# Patient Record
Sex: FEMALE | Race: WHITE | NOT HISPANIC OR LATINO | Employment: FULL TIME | URBAN - METROPOLITAN AREA
[De-identification: names, ages, dates, MRNs, and addresses within clinical notes are randomized per-mention and may not be internally consistent; named-entity substitution may affect disease eponyms.]

---

## 2017-04-05 ENCOUNTER — OFFICE VISIT (OUTPATIENT)
Dept: OPTOMETRY | Facility: CLINIC | Age: 42
End: 2017-04-05
Payer: COMMERCIAL

## 2017-04-05 DIAGNOSIS — H02.88A MEIBOMIAN GLAND DYSFUNCTION (MGD) OF UPPER AND LOWER LIDS OF BOTH EYES: ICD-10-CM

## 2017-04-05 DIAGNOSIS — H02.88B MEIBOMIAN GLAND DYSFUNCTION (MGD) OF UPPER AND LOWER LIDS OF BOTH EYES: ICD-10-CM

## 2017-04-05 DIAGNOSIS — H52.203 MYOPIA OF BOTH EYES WITH ASTIGMATISM: Primary | ICD-10-CM

## 2017-04-05 DIAGNOSIS — H52.13 MYOPIA OF BOTH EYES WITH ASTIGMATISM: Primary | ICD-10-CM

## 2017-04-05 PROCEDURE — 92015 DETERMINE REFRACTIVE STATE: CPT | Performed by: OPTOMETRIST

## 2017-04-05 PROCEDURE — 92004 COMPRE OPH EXAM NEW PT 1/>: CPT | Performed by: OPTOMETRIST

## 2017-04-05 ASSESSMENT — VISUAL ACUITY
OD_CC: 20/30
OD_SC+: -1
OD_CC: 20/20
OS_SC: 20/80
METHOD: SNELLEN - LINEAR
OS_CC: 20/20
METHOD_MR: PRA 1.25
CORRECTION_TYPE: GLASSES
OD_SC: 20/80
OS_SC+: -1
OS_CC: 20/30

## 2017-04-05 ASSESSMENT — REFRACTION_WEARINGRX
OS_AXIS: 083
OS_CYLINDER: +0.75
OD_AXIS: 056
OD_SPHERE: -2.50
OD_CYLINDER: +0.75
OS_SPHERE: -2.75

## 2017-04-05 ASSESSMENT — TONOMETRY
IOP_METHOD: APPLANATION
OD_IOP_MMHG: 10
OS_IOP_MMHG: 11

## 2017-04-05 ASSESSMENT — CONF VISUAL FIELD
OS_NORMAL: 1
OD_NORMAL: 1

## 2017-04-05 ASSESSMENT — REFRACTION_MANIFEST
OD_AXIS: 091
OS_AXIS: 087
OD_CYLINDER: +0.50
OS_CYLINDER: +0.75
OS_SPHERE: -3.00
OD_SPHERE: -2.50

## 2017-04-05 ASSESSMENT — CUP TO DISC RATIO
OD_RATIO: 0.4
OS_RATIO: 0.35

## 2017-04-05 ASSESSMENT — EXTERNAL EXAM - LEFT EYE: OS_EXAM: NORMAL

## 2017-04-05 ASSESSMENT — EXTERNAL EXAM - RIGHT EYE: OD_EXAM: NORMAL

## 2017-04-05 NOTE — PROGRESS NOTES
Chief Complaint   Patient presents with     COMPREHENSIVE EYE EXAM      Near vision complaints  Last Eye Exam: 3yrs  Dilated Previously: Yes    What are you currently using to see?  glasses     works as a pharmacist at the [employer]     Distance Vision Acuity: Satisfied with vision    Near Vision Acuity: Not satisfied     Eye Comfort: good  Do you use eye drops? : No  Occupation or HobbiesComputer/Sewing             Medical, surgical and family histories reviewed and updated 4/5/2017.       OBJECTIVE: See Ophthalmology exam    ASSESSMENT:    ICD-10-CM    1. Myopia of both eyes with astigmatism H52.13 EYE EXAM (SIMPLE-NONBILLABLE)    H52.203 REFRACTION   2. Meibomian gland dysfunction (MGD) of upper and lower lids of both eyes H02.89     H01.002     H01.004     H01.005     without symptomatic dry eye    PLAN:   Warm compresses / lid massage, fish oil  Alexsandra Zavala OD

## 2017-04-05 NOTE — MR AVS SNAPSHOT
After Visit Summary   4/5/2017    Jayleen Mccabe    MRN: [id number]           Patient Information     Date Of Birth          1975        Visit Information        Provider Department      4/5/2017 2:00 PM Alexsandra Zavala OD [facility]        Today's Diagnoses     Myopia of left eye with astigmatism    -  1    Meibomian gland dysfunction (MGD) of upper and lower lids of both eyes          Care Instructions    Very little prescription change , emerging presbyopia ( loss of focus at Near)  Meibomian gland dysfunction which causes dry eye   DRY EYE TREATMENT    I recommend using artificial tears for your dry eye. There are over the counter drops that work well and may be used up to 4x daily. ( systane balance, refresh optive, soothe xp) If you need more than 4 drops daily, use a preservative free product which come in individual vials which may be used for 24 hours and discarded.   Artificial tears work best as a preventative and not as well after your eyes are starting to bother you.  It may take 4- 6 weeks of using the drops before you notice improvement.  If after that time you are still having problems schedule an appointment for an evaluation and discussion of different treatments.  Dry eyes are a chronic condition and you may have more symptoms at certain times of the year.      In addition:  Warm compresses once to twice daily for 5-10 minutes    Omega 3 fatty acid supplements 1-2x daily           Follow-ups after your visit        Your next 10 appointments already scheduled     Apr 13, 2017  7:40 AM CDT   MyChart Physical Adult with Ana Ryan MD   [facility] ([facility])    [address]  [address]  [place] [id number] [phone]              Who to contact     If you have questions or need follow up information about today's clinic visit or your schedule please contact [facility] directly at  [phone].  Normal or non-critical lab and imaging results will be communicated to you by MyChart, letter or phone within 4 business days after the clinic has received the results. If you do not hear from us within 7 days, please contact the clinic through LumiFoldhart or phone. If you have a critical or abnormal lab result, we will notify you by phone as soon as possible.  Submit refill requests through "[employer] or call your pharmacy and they will forward the refill request to us. Please allow 3 business days for your refill to be completed.          Additional Information About Your Visit        LumiFoldhart Information     "[employer] gives you secure access to your electronic health record. If you see a primary care provider, you can also send messages to your care team and make appointments. If you have questions, please call your primary care clinic.  If you do not have a primary care provider, please call [phone] and they will assist you.        Care EveryWhere ID     This is your Care EveryWhere ID. This could be used by other organizations to access your [place] medical records  [id number]         Blood Pressure from Last 3 Encounters:   04/18/16 100/70   05/18/15 94/60   05/05/14 96/64    Weight from Last 3 Encounters:   04/18/16 47.2 kg (104 lb)   05/18/15 47.6 kg (105 lb)   05/05/14 47.4 kg (104 lb 8 oz)              Today, you had the following     No orders found for display       Primary Care Provider Office Phone # Fax #    Ana Ryan [phone]       [facility] [address] [place] [address]        Thank you!     Thank you for choosing [facility]  for your care. Our goal is always to provide you with excellent care. Hearing back from our patients is one way we can continue to improve our services. Please take a few minutes to complete the written survey that you may receive in the mail after your visit with us. Thank you!             Your  Updated Medication List - Protect others around you: Learn how to safely use, store and throw away your medicines at www.disposemymeds.org.          This list is accurate as of: 4/5/17  2:53 PM.  Always use your most recent med list.                   Brand Name Dispense Instructions for use    levothyroxine 150 MCG tablet    SYNTHROID/LEVOTHROID    90 tablet    Take 1 tablet (150 mcg) by mouth daily

## 2017-04-05 NOTE — PATIENT INSTRUCTIONS
Very little prescription change , emerging presbyopia ( loss of focus at Near)  Meibomian gland dysfunction which causes dry eye   DRY EYE TREATMENT    I recommend using artificial tears for your dry eye. There are over the counter drops that work well and may be used up to 4x daily. ( systane balance, refresh optive, soothe xp) If you need more than 4 drops daily, use a preservative free product which come in individual vials which may be used for 24 hours and discarded.   Artificial tears work best as a preventative and not as well after your eyes are starting to bother you.  It may take 4- 6 weeks of using the drops before you notice improvement.  If after that time you are still having problems schedule an appointment for an evaluation and discussion of different treatments.  Dry eyes are a chronic condition and you may have more symptoms at certain times of the year.      In addition:  Warm compresses once to twice daily for 5-10 minutes    Omega 3 fatty acid supplements 1-2x daily

## 2017-04-13 ENCOUNTER — OFFICE VISIT (OUTPATIENT)
Dept: PEDIATRICS | Facility: CLINIC | Age: 42
End: 2017-04-13
Payer: COMMERCIAL

## 2017-04-13 VITALS
SYSTOLIC BLOOD PRESSURE: 98 MMHG | HEART RATE: 74 BPM | WEIGHT: 100 LBS | OXYGEN SATURATION: 100 % | DIASTOLIC BLOOD PRESSURE: 62 MMHG | TEMPERATURE: 97.1 F | BODY MASS INDEX: 17.72 KG/M2 | HEIGHT: 63 IN

## 2017-04-13 DIAGNOSIS — Z12.31 VISIT FOR SCREENING MAMMOGRAM: ICD-10-CM

## 2017-04-13 DIAGNOSIS — E03.9 HYPOTHYROIDISM, UNSPECIFIED: ICD-10-CM

## 2017-04-13 DIAGNOSIS — Z00.00 ENCOUNTER FOR ROUTINE ADULT HEALTH EXAMINATION WITHOUT ABNORMAL FINDINGS: Primary | ICD-10-CM

## 2017-04-13 DIAGNOSIS — S59.901A INJURY OF RIGHT ELBOW, INITIAL ENCOUNTER: ICD-10-CM

## 2017-04-13 PROCEDURE — 82306 VITAMIN D 25 HYDROXY: CPT | Performed by: PEDIATRICS

## 2017-04-13 PROCEDURE — 84443 ASSAY THYROID STIM HORMONE: CPT | Performed by: PEDIATRICS

## 2017-04-13 PROCEDURE — 84439 ASSAY OF FREE THYROXINE: CPT | Performed by: PEDIATRICS

## 2017-04-13 PROCEDURE — 36415 COLL VENOUS BLD VENIPUNCTURE: CPT | Performed by: PEDIATRICS

## 2017-04-13 PROCEDURE — 99396 PREV VISIT EST AGE 40-64: CPT | Performed by: PEDIATRICS

## 2017-04-13 RX ORDER — LEVOTHYROXINE SODIUM 150 UG/1
150 TABLET ORAL DAILY
Qty: 90 TABLET | Refills: 3 | Status: SHIPPED | OUTPATIENT
Start: 2017-04-13 | End: 2018-05-21

## 2017-04-13 NOTE — MR AVS SNAPSHOT
After Visit Summary   4/13/2017    Jayleen Mccabe    MRN: [id number]           Patient Information     Date Of Birth          1975        Visit Information        Provider Department      4/13/2017 7:40 AM Ana Ryan MD [facility]        Today's Diagnoses     Encounter for routine adult health examination without abnormal findings    -  1    Hypothyroidism, unspecified        Visit for screening mammogram        Injury of right elbow, initial encounter          Care Instructions    Plan to schedule your mammogram this summer    Labs today - this is now on the first floor    I'll let you know if we need to change your levothyroxine dosing          Preventive Health Recommendations  Female Ages 40 to 49    Yearly exam:     See your health care provider every year in order to  1. Review health changes.   2. Discuss preventive care.    3. Review your medicines if your doctor prescribed any.      Get a Pap test every three years (unless you have an abnormal result and your provider advises testing more often).      If you get Pap tests with HPV test, you only need to test every 5 years, unless you have an abnormal result. You do not need a Pap test if your uterus was removed (hysterectomy) and you have not had cancer.      You should be tested each year for STDs (sexually transmitted diseases), if you're at risk.       Ask your doctor if you should have a mammogram.      Have a colonoscopy (test for colon cancer) if someone in your family has had colon cancer or polyps before age 50.       Have a cholesterol test every 5 years.       Have a diabetes test (fasting glucose) after age 45. If you are at risk for diabetes, you should have this test every 3 years.    Shots: Get a flu shot each year. Get a tetanus shot every 10 years.     Nutrition:     Eat at least 5 servings of fruits and vegetables each day.    Eat whole-grain bread, whole-wheat pasta and brown rice instead of white  grains and rice.    Talk to your provider about Calcium and Vitamin D.     Lifestyle    Exercise at least 150 minutes a week (an average of 30 minutes a day, 5 days a week). This will help you control your weight and prevent disease.    Limit alcohol to one drink per day.    No smoking.     Wear sunscreen to prevent skin cancer.    See your dentist every six months for an exam and cleaning.        Follow-ups after your visit        Future tests that were ordered for you today     Open Future Orders        Priority Expected Expires Ordered    MA Screen Bilateral w/Faustino Routine  4/13/2018 4/13/2017            Who to contact     If you have questions or need follow up information about today's clinic visit or your schedule please contact [facility] directly at [phone].  Normal or non-critical lab and imaging results will be communicated to you by Re[employer]hart, letter or phone within 4 business days after the clinic has received the results. If you do not hear from us within 7 days, please contact the clinic through [employer] or phone. If you have a critical or abnormal lab result, we will notify you by phone as soon as possible.  Submit refill requests through [employer] or call your pharmacy and they will forward the refill request to us. Please allow 3 business days for your refill to be completed.          Additional Information About Your Visit        [employer] Information     [employer] gives you secure access to your electronic health record. If you see a primary care provider, you can also send messages to your care team and make appointments. If you have questions, please call your primary care clinic.  If you do not have a primary care provider, please call [phone] and they will assist you.        Care EveryWhere ID     This is your Care EveryWhere ID. This could be used by other organizations to access your [place] medical records  [id number]        Your Vitals Were     Pulse Temperature Height  "Pulse Oximetry BMI (Body Mass Index)       74 97.1  F (36.2  C) (Tympanic) 5' 2.5\" (1.588 m) 100% 18 kg/m2        Blood Pressure from Last 3 Encounters:   04/13/17 98/62   04/18/16 100/70   05/18/15 94/60    Weight from Last 3 Encounters:   04/13/17 100 lb (45.4 kg)   04/18/16 104 lb (47.2 kg)   05/18/15 105 lb (47.6 kg)              We Performed the Following     TSH with free T4 reflex     Vitamin D deficiency screening          Where to get your medicines      These medications were sent to [id number]  [place], [place] - [address]  [address] [address]     Phone:  [phone]     levothyroxine 150 MCG tablet          Primary Care Provider Office Phone # Fax #    Ana Ryan [phone]       [address] [place] [address]        Thank you!     Thank you for choosing [facility]  for your care. Our goal is always to provide you with excellent care. Hearing back from our patients is one way we can continue to improve our services. Please take a few minutes to complete the written survey that you may receive in the mail after your visit with us. Thank you!             Your Updated Medication List - Protect others around you: Learn how to safely use, store and throw away your medicines at www.disposemymeds.org.          This list is accurate as of: 4/13/17  8:07 AM.  Always use your most recent med list.                   Brand Name Dispense Instructions for use    levothyroxine 150 MCG tablet    SYNTHROID/LEVOTHROID    90 tablet    Take 1 tablet (150 mcg) by mouth daily         "

## 2017-04-13 NOTE — NURSING NOTE
"Chief Complaint   Patient presents with     Physical       Initial BP 98/62 (BP Location: Right arm, Patient Position: Chair, Cuff Size: Adult Regular)  Pulse 74  Temp 97.1  F (36.2  C) (Tympanic)  Ht 5' 2.5\" (1.588 m)  Wt 100 lb (45.4 kg)  SpO2 100%  BMI 18 kg/m2 Estimated body mass index is 18 kg/(m^2) as calculated from the following:    Height as of this encounter: 5' 2.5\" (1.588 m).    Weight as of this encounter: 100 lb (45.4 kg).  Medication Reconciliation: complete  "

## 2017-04-13 NOTE — PROGRESS NOTES
SUBJECTIVE:     CC: Jayleen Mccabe is an 42 year old woman who presents for preventive health visit.     Physical   Annual:     Getting at least 3 servings of Calcium per day::  Yes    Bi-annual eye exam::  Yes    Dental care twice a year::  Yes    Sleep apnea or symptoms of sleep apnea::  None    Diet::  Regular (no restrictions)    Frequency of exercise::  2-3 days/week    Duration of exercise::  15-30 minutes    Taking medications regularly::  Yes    Medication side effects::  Not applicable    Additional concerns today::  No      - fall on ice, look at right elbow     Today's PHQ-2 Score:   PHQ-2 ( 1999 Pfizer) 4/10/2017   Little interest or pleasure in doing things Not at all   Feeling down, depressed or hopeless Not at all   PHQ-2 Score 0       Abuse: Current or Past(Physical, Sexual or Emotional)- No  Do you feel safe in your environment - Yes    Social History   Substance Use Topics     Smoking status: Never Smoker     Smokeless tobacco: Never Used     Alcohol use No     The patient does not drink >3 drinks per day nor >7 drinks per week.    Recent Labs   Lab Test  05/05/14   1337  05/31/13   0754   CHOL   --   146   HDL   --   66   LDL  70  71   TRIG   --   44   CHOLHDLRATIO   --   2.2       Reviewed orders with patient.  Reviewed health maintenance and updated orders accordingly - Yes    Mammo Decision Support:  Patient under age 50, mutual decision reflected in health maintenance.      Pertinent mammograms are reviewed under the imaging tab.  History of abnormal Pap smear: NO - age 30- 65 PAP every 3 years recommended    Reviewed and updated as needed this visit by clinical staff  Tobacco  Allergies  Med Hx  Surg Hx  Fam Hx  Soc Hx        Reviewed and updated as needed this visit by Provider            Fall on the fight elbow - still hurts a little.  Full ROM.  No swelling.  Fall was about 2 months ago - still painful if leans on elbow with too much weight.    Hypothyroidism - taking medication as  "directed.  No symptoms or low or high thyroid.      Doing well with work, family.    No changes to family history.    Regular periods, not heavy.  Condoms for birth control.       ROS: 10 point ROS neg other than the symptoms noted above in the HPI.      Problem list, Medication list, Allergies, and Medical/Social/Surgical histories reviewed in [facility] and updated as appropriate.  OBJECTIVE:     BP 98/62 (BP Location: Right arm, Patient Position: Chair, Cuff Size: Adult Regular)  Pulse 74  Temp 97.1  F (36.2  C) (Tympanic)  Ht 5' 2.5\" (1.588 m)  Wt 100 lb (45.4 kg)  SpO2 100%  BMI 18 kg/m2  EXAM:  GENERAL: healthy, alert and no distress  EYES: Eyes grossly normal to inspection, PERRL and conjunctivae and sclerae normal  HENT: ear canals and TM's normal, nose and mouth without ulcers or lesions  NECK: no adenopathy, no asymmetry, masses, or scars and thyroid normal to palpation  RESP: lungs clear to auscultation - no rales, rhonchi or wheezes  BREAST: normal without masses, tenderness or nipple discharge and no palpable axillary masses or adenopathy  CV: regular rate and rhythm, normal S1 S2, no S3 or S4, no murmur, click or rub, no peripheral edema and peripheral pulses strong  ABDOMEN: soft, nontender, no hepatosplenomegaly, no masses and bowel sounds normal  MS: full right elbow ROM, no tenderness or swelling, otherwise no gross musculoskeletal defects noted, no edema  SKIN: no suspicious lesions or rashes  NEURO: Normal strength and tone, mentation intact and speech normal  PSYCH: mentation appears normal, affect normal/bright    ASSESSMENT/PLAN:         ICD-10-CM    1. Encounter for routine adult health examination without abnormal findings Z00.00 TSH with free T4 reflex     Vitamin D deficiency screening - currently taking 1000 IU daily.  Small boned - concern for higher risk of osteopenia   2. Hypothyroidism, unspecified E03.9 levothyroxine (SYNTHROID/LEVOTHROID) 150 MCG tablet     TSH with free T4 reflex " "  3. Visit for screening mammogram Z12.31 MA Screen Bilateral w/Faustino   4. Injury of right elbow, initial encounter S58.516N Anticipate continued improvement with resolution of pain over time - patient to alert me if pain worsens       COUNSELING:  Special attention given to:        Regular exercise       Healthy diet/nutrition       Vision screening       Contraception       Osteoporosis Prevention/Bone Health         reports that she has never smoked. She has never used smokeless tobacco.    Estimated body mass index is 18 kg/(m^2) as calculated from the following:    Height as of this encounter: 5' 2.5\" (1.588 m).    Weight as of this encounter: 100 lb (45.4 kg).       Counseling Resources:  ATP IV Guidelines  Pooled Cohorts Equation Calculator  Breast Cancer Risk Calculator  FRAX Risk Assessment  ICSI Preventive Guidelines  Dietary Guidelines for Americans, 2010  USDA's MyPlate  ASA Prophylaxis  Lung CA Screening    Ana Ryan MD  [facility] [place]  "

## 2017-04-13 NOTE — PATIENT INSTRUCTIONS
Plan to schedule your mammogram this summer    Labs today - this is now on the first floor    I'll let you know if we need to change your levothyroxine dosing          Preventive Health Recommendations  Female Ages 40 to 49    Yearly exam:     See your health care provider every year in order to  1. Review health changes.   2. Discuss preventive care.    3. Review your medicines if your doctor prescribed any.      Get a Pap test every three years (unless you have an abnormal result and your provider advises testing more often).      If you get Pap tests with HPV test, you only need to test every 5 years, unless you have an abnormal result. You do not need a Pap test if your uterus was removed (hysterectomy) and you have not had cancer.      You should be tested each year for STDs (sexually transmitted diseases), if you're at risk.       Ask your doctor if you should have a mammogram.      Have a colonoscopy (test for colon cancer) if someone in your family has had colon cancer or polyps before age 50.       Have a cholesterol test every 5 years.       Have a diabetes test (fasting glucose) after age 45. If you are at risk for diabetes, you should have this test every 3 years.    Shots: Get a flu shot each year. Get a tetanus shot every 10 years.     Nutrition:     Eat at least 5 servings of fruits and vegetables each day.    Eat whole-grain bread, whole-wheat pasta and brown rice instead of white grains and rice.    Talk to your provider about Calcium and Vitamin D.     Lifestyle    Exercise at least 150 minutes a week (an average of 30 minutes a day, 5 days a week). This will help you control your weight and prevent disease.    Limit alcohol to one drink per day.    No smoking.     Wear sunscreen to prevent skin cancer.    See your dentist every six months for an exam and cleaning.

## 2017-04-14 LAB
DEPRECATED CALCIDIOL+CALCIFEROL SERPL-MC: 27 UG/L (ref 20–75)
T4 FREE SERPL-MCNC: 1.09 NG/DL (ref 0.76–1.46)
TSH SERPL DL<=0.005 MIU/L-ACNC: 9.63 MU/L (ref 0.4–4)

## 2017-04-15 ENCOUNTER — MYC MEDICAL ADVICE (OUTPATIENT)
Dept: PEDIATRICS | Facility: CLINIC | Age: 42
End: 2017-04-15

## 2017-04-28 ENCOUNTER — RADIANT APPOINTMENT (OUTPATIENT)
Dept: MAMMOGRAPHY | Facility: CLINIC | Age: 42
End: 2017-04-28
Attending: PEDIATRICS
Payer: COMMERCIAL

## 2017-04-28 DIAGNOSIS — Z12.31 VISIT FOR SCREENING MAMMOGRAM: ICD-10-CM

## 2017-04-28 PROCEDURE — 77063 BREAST TOMOSYNTHESIS BI: CPT | Mod: TC

## 2017-04-28 PROCEDURE — G0202 SCR MAMMO BI INCL CAD: HCPCS | Mod: TC

## 2017-09-11 ENCOUNTER — MYC MEDICAL ADVICE (OUTPATIENT)
Dept: PEDIATRICS | Facility: CLINIC | Age: 42
End: 2017-09-11

## 2017-09-12 DIAGNOSIS — E03.9 HYPOTHYROIDISM, UNSPECIFIED: ICD-10-CM

## 2017-09-12 PROCEDURE — 36415 COLL VENOUS BLD VENIPUNCTURE: CPT | Performed by: PEDIATRICS

## 2017-09-12 PROCEDURE — 84443 ASSAY THYROID STIM HORMONE: CPT | Performed by: PEDIATRICS

## 2017-09-13 LAB — TSH SERPL DL<=0.005 MIU/L-ACNC: 2.78 MU/L (ref 0.4–4)

## 2018-05-20 ASSESSMENT — ENCOUNTER SYMPTOMS
CHILLS: 0
DIZZINESS: 0
HEADACHES: 0
ABDOMINAL PAIN: 0
HEMATOCHEZIA: 0
FREQUENCY: 0
CONSTIPATION: 0
WEAKNESS: 0
DIARRHEA: 0
HEMATURIA: 0
NERVOUS/ANXIOUS: 0
NAUSEA: 0
SHORTNESS OF BREATH: 0
HEARTBURN: 0
PALPITATIONS: 0
EYE PAIN: 0
JOINT SWELLING: 0
COUGH: 0
SORE THROAT: 0
FEVER: 0
ARTHRALGIAS: 0
PARESTHESIAS: 0
MYALGIAS: 0
DYSURIA: 0

## 2018-05-21 ENCOUNTER — OFFICE VISIT (OUTPATIENT)
Dept: PEDIATRICS | Facility: CLINIC | Age: 43
End: 2018-05-21
Payer: COMMERCIAL

## 2018-05-21 VITALS
BODY MASS INDEX: 18.61 KG/M2 | HEIGHT: 63 IN | DIASTOLIC BLOOD PRESSURE: 58 MMHG | TEMPERATURE: 99.7 F | SYSTOLIC BLOOD PRESSURE: 90 MMHG | WEIGHT: 105 LBS | OXYGEN SATURATION: 97 % | HEART RATE: 93 BPM

## 2018-05-21 DIAGNOSIS — Z00.00 ROUTINE GENERAL MEDICAL EXAMINATION AT A HEALTH CARE FACILITY: Primary | ICD-10-CM

## 2018-05-21 DIAGNOSIS — E55.9 VITAMIN D DEFICIENCY: ICD-10-CM

## 2018-05-21 DIAGNOSIS — Z12.4 SCREENING FOR MALIGNANT NEOPLASM OF CERVIX: ICD-10-CM

## 2018-05-21 DIAGNOSIS — E03.9 HYPOTHYROIDISM, UNSPECIFIED TYPE: ICD-10-CM

## 2018-05-21 PROCEDURE — 99396 PREV VISIT EST AGE 40-64: CPT | Performed by: PEDIATRICS

## 2018-05-21 PROCEDURE — 87624 HPV HI-RISK TYP POOLED RSLT: CPT | Performed by: PEDIATRICS

## 2018-05-21 PROCEDURE — G0145 SCR C/V CYTO,THINLAYER,RESCR: HCPCS | Performed by: PEDIATRICS

## 2018-05-21 RX ORDER — LEVOTHYROXINE SODIUM 150 UG/1
150 TABLET ORAL DAILY
Qty: 90 TABLET | Refills: 3 | Status: SHIPPED | OUTPATIENT
Start: 2018-05-21 | End: 2019-06-04

## 2018-05-21 ASSESSMENT — ENCOUNTER SYMPTOMS
HEMATURIA: 0
SORE THROAT: 0
FREQUENCY: 0
HEMATOCHEZIA: 0
PARESTHESIAS: 0
ABDOMINAL PAIN: 0
EYE PAIN: 0
JOINT SWELLING: 0
SHORTNESS OF BREATH: 0
NAUSEA: 0
CONSTIPATION: 0
DIARRHEA: 0
MYALGIAS: 0
HEARTBURN: 0
HEADACHES: 0
CHILLS: 0
ARTHRALGIAS: 0
PALPITATIONS: 0
WEAKNESS: 0
DIZZINESS: 0
NERVOUS/ANXIOUS: 0
COUGH: 0
DYSURIA: 0
FEVER: 0

## 2018-05-21 NOTE — MR AVS SNAPSHOT
After Visit Summary   5/21/2018    Jayleen Mccabe    MRN: [id number]           Patient Information     Date Of Birth          1975        Visit Information        Provider Department      5/21/2018 3:00 PM Ana Ryan MD [facility] [place]        Today's Diagnoses     Routine general medical examination at a health care facility    -  1    Hypothyroidism, unspecified type        Screening for malignant neoplasm of cervix        Vitamin D deficiency          Care Instructions    Change to name brand synthroid and then come back for labs in about 6 weeks (fasting lab visit)        Preventive Health Recommendations  Female Ages 40 to 49    Yearly exam:     See your health care provider every year in order to  1. Review health changes.   2. Discuss preventive care.    3. Review your medicines if your doctor prescribed any.      Get a Pap test every three years (unless you have an abnormal result and your provider advises testing more often).      If you get Pap tests with HPV test, you only need to test every 5 years, unless you have an abnormal result. You do not need a Pap test if your uterus was removed (hysterectomy) and you have not had cancer.      You should be tested each year for STDs (sexually transmitted diseases), if you're at risk.       Ask your doctor if you should have a mammogram.      Have a colonoscopy (test for colon cancer) if someone in your family has had colon cancer or polyps before age 50.       Have a cholesterol test every 5 years.       Have a diabetes test (fasting glucose) after age 45. If you are at risk for diabetes, you should have this test every 3 years.    Shots: Get a flu shot each year. Get a tetanus shot every 10 years.     Nutrition:     Eat at least 5 servings of fruits and vegetables each day.    Eat whole-grain bread, whole-wheat pasta and brown rice instead of white grains and rice.    Talk to your provider about Calcium and Vitamin D.      Lifestyle    Exercise at least 150 minutes a week (an average of 30 minutes a day, 5 days a week). This will help you control your weight and prevent disease.    Limit alcohol to one drink per day.    No smoking.     Wear sunscreen to prevent skin cancer.    See your dentist every six months for an exam and cleaning.          Follow-ups after your visit        Future tests that were ordered for you today     Open Future Orders        Priority Expected Expires Ordered    TSH Routine  11/21/2018 5/21/2018    T4, free Routine  11/21/2018 5/21/2018    **Comprehensive metabolic panel FUTURE anytime Routine 5/21/2018 11/21/2018 5/21/2018    Lipid panel reflex to direct LDL Fasting Routine 5/21/2018 11/21/2018 5/21/2018    Vitamin D Deficiency Routine  11/21/2018 5/21/2018            Who to contact     If you have questions or need follow up information about today's clinic visit or your schedule please contact [facility] directly at [phone].  Normal or non-critical lab and imaging results will be communicated to you by [employer], letter or phone within 4 business days after the clinic has received the results. If you do not hear from us within 7 days, please contact the clinic through [employer] or phone. If you have a critical or abnormal lab result, we will notify you by phone as soon as possible.  Submit refill requests through [employer] or call your pharmacy and they will forward the refill request to us. Please allow 3 business days for your refill to be completed.          Additional Information About Your Visit        [employer] Information     [employer] gives you secure access to your electronic health record. If you see a primary care provider, you can also send messages to your care team and make appointments. If you have questions, please call your primary care clinic.  If you do not have a primary care provider, please call [phone] and they will assist you.        Care EveryWhere ID     This is  "your Care EveryWhere ID. This could be used by other organizations to access your [place] medical records  [id number]        Your Vitals Were     Pulse Temperature Height Last Period Pulse Oximetry BMI (Body Mass Index)    93 99.7  F (37.6  C) (Tympanic) 5' 2.5\" (1.588 m) 04/28/2018 (Approximate) 97% 18.9 kg/m2       Blood Pressure from Last 3 Encounters:   05/21/18 90/58   04/13/17 98/62   04/18/16 100/70    Weight from Last 3 Encounters:   05/21/18 105 lb (47.6 kg)   04/13/17 100 lb (45.4 kg)   04/18/16 104 lb (47.2 kg)              We Performed the Following     HPV High Risk Types DNA Cervical     Pap imaged thin layer screen with HPV - recommended age 30 - 65 years (select HPV order below)          Where to get your medicines      These medications were sent to [facility] [place] - [place] - [address]   [address], [place] [address]     Phone:  [phone]     levothyroxine 150 MCG tablet          Primary Care Provider Office Phone # Fax #    Ana Ryan [phone]       [address] [place] [address]        Equal Access to Services     [facility]: Hadii al ku hadasho Soomaali, waaxda luqadaha, qaybta kaalmada adeegyada, dawn benitez. So [facility] [phone].    ATENCIÓN: Si habla español, tiene a gr disposición servicios gratuitos de asistencia lingüística. Llame al [phone].    We comply with applicable federal civil rights laws and [place] laws. We do not discriminate on the basis of race, color, national origin, age, disability, sex, sexual orientation, or gender identity.            Thank you!     Thank you for choosing [facility]  for your care. Our goal is always to provide you with excellent care. Hearing back from our patients is one way we can continue to improve our services. Please take a few minutes to complete the written survey that you may receive in the " mail after your visit with us. Thank you!             Your Updated Medication List - Protect others around you: Learn how to safely use, store and throw away your medicines at www.disposemymeds.org.          This list is accurate as of 5/21/18  4:04 PM.  Always use your most recent med list.                   Brand Name Dispense Instructions for use Diagnosis    levothyroxine 150 MCG tablet    SYNTHROID/LEVOTHROID    90 tablet    Take 1 tablet (150 mcg) by mouth daily    Hypothyroidism, unspecified type

## 2018-05-21 NOTE — LETTER
May 29, 2018      Jayleen Mccabe  [address]  [place] [address]    Dear ,      This letter is in regards to the pap smear and HPV (Human Papillomavirus) test you had done recently. Your PAP test result is normal, but your HPV (Human Papillomavirus) test was positive for a high risk type of the HPV. HPV is a common virus found in sexually active men and women. Some high risk strains of the HPV virus can be risk factors for later development of cervical cancer.    About 80 percent of women have been exposed to HPV virus throughout their lifetime. There is no medication for the treatment of HPV. Typically your own immune system gets rid of the virus before it does harm. HPV is spread by direct skin-to-skin contact, including sexual intercourse, oral sex, anal sex, or any other contact involving the genital area (example: hand to genital contact). It is not possible to become infected with HPV by touching an object, such as a toilet seat. Most people who are infected with HPV have no signs or symptoms.    Your doctor has recommended that you have specific test called Colposcopy. This test allows the provider to closely examine your cervix. If biopsies are taken, the results will be complete within two weeks and will be used to determine the plan for future follow-up.      Please call [facility] [place] [facility] at [phone] to schedule this procedure with one of the ObGyn providers. Schedule this for a time when you are not due to have a period (if having regular menstrual bleeding). You can take an over the counter pain reliever 1 hour before your colposcopy. Nothing in the vagina for 24 hours before your colposcopy (no sex, douches, vaginal medications or lubricants).     If you have questions regarding this result please contact [phone].    Sincerely,      Ana Ryan MD/Lynette Nissen RN

## 2018-05-21 NOTE — PROGRESS NOTES
SUBJECTIVE:   CC: Jayleen Mccabe is an 43 year old woman who presents for preventive health visit.     Physical   Annual:     Getting at least 3 servings of Calcium per day::  Yes    Bi-annual eye exam::  Yes    Dental care twice a year::  Yes    Sleep apnea or symptoms of sleep apnea::  None    Diet::  Regular (no restrictions) and Gluten-free/reduced    Frequency of exercise::  2-3 days/week    Duration of exercise::  30-45 minutes    Taking medications regularly::  Yes    Medication side effects::  None    Additional concerns today::  YES (refills )                Today's PHQ-2 Score:   PHQ-2 ( 1999 Pfizer) 5/20/2018   Q1: Little interest or pleasure in doing things 0   Q2: Feeling down, depressed or hopeless 0   PHQ-2 Score 0   Q1: Little interest or pleasure in doing things Not at all   Q2: Feeling down, depressed or hopeless Not at all   PHQ-2 Score 0       Abuse: Current or Past(Physical, Sexual or Emotional)- No  Do you feel safe in your environment - Yes    Social History   Substance Use Topics     Smoking status: Never Smoker     Smokeless tobacco: Never Used     Alcohol use No     Alcohol Use 5/20/2018   If you drink alcohol do you typically have greater than 3 drinks per day OR greater than 7 drinks per week? No   No flowsheet data found.    Reviewed orders with patient.  Reviewed health maintenance and updated orders accordingly - Yes    Patient under age 50, mutual decision reflected in health maintenance.      Pertinent mammograms are reviewed under the imaging tab.  History of abnormal Pap smear: NO - age 30- 65 PAP every 3 years recommended    Reviewed and updated as needed this visit by clinical staff  Tobacco  Allergies  Meds  Med Hx  Surg Hx  Fam Hx  Soc Hx        Reviewed and updated as needed this visit by Provider          Hypothyroidism - most recent labs are normal, but still feels a little fuzzy in her thinking and would like to try synthroid name brand.  No other thyroid symptoms  "noted.    Vitamin D low last year - has started supplementation      Review of Systems   Constitutional: Negative for chills and fever.   HENT: Negative for congestion, ear pain, hearing loss and sore throat.    Eyes: Negative for pain and visual disturbance.   Respiratory: Negative for cough and shortness of breath.    Cardiovascular: Negative for chest pain, palpitations and peripheral edema.   Gastrointestinal: Negative for abdominal pain, constipation, diarrhea, heartburn, hematochezia and nausea.   Genitourinary: Negative for dysuria, frequency, genital sores, hematuria, pelvic pain, urgency, vaginal bleeding and vaginal discharge.   Musculoskeletal: Negative for arthralgias, joint swelling and myalgias.   Skin: Negative for rash.   Neurological: Negative for dizziness, weakness, headaches and paresthesias.   Psychiatric/Behavioral: Negative for mood changes. The patient is not nervous/anxious.           OBJECTIVE:   BP 90/58 (BP Location: Right arm, Patient Position: Right side, Cuff Size: Adult Regular)  Pulse 93  Temp 99.7  F (37.6  C) (Tympanic)  Ht 5' 2.5\" (1.588 m)  Wt 105 lb (47.6 kg)  LMP 04/28/2018 (Approximate)  SpO2 97%  BMI 18.9 kg/m2  Physical Exam  GENERAL: healthy, alert and no distress  EYES: Eyes grossly normal to inspection, PERRL and conjunctivae and sclerae normal  HENT: ear canals and TM's normal, nose and mouth without ulcers or lesions  NECK: no adenopathy, no asymmetry, masses, or scars and thyroid normal to palpation  RESP: lungs clear to auscultation - no rales, rhonchi or wheezes  BREAST: normal without masses, tenderness or nipple discharge and no palpable axillary masses or adenopathy  CV: regular rate and rhythm, normal S1 S2, no S3 or S4, no murmur, click or rub, no peripheral edema and peripheral pulses strong  ABDOMEN: soft, nontender, no hepatosplenomegaly, no masses and bowel sounds normal   (female): normal female external genitalia, normal urethral meatus, vaginal " "mucosa pink, moist, well rugated, and normal cervix/adnexa/uterus without masses or discharge  MS: no gross musculoskeletal defects noted, no edema  SKIN: small, healing sore on tailbone  NEURO: Normal strength and tone, mentation intact and speech normal  PSYCH: mentation appears normal, affect normal/bright    ASSESSMENT/PLAN:       ICD-10-CM    1. Routine general medical examination at a health care facility Z00.00 **Comprehensive metabolic panel FUTURE anytime     Lipid panel reflex to direct LDL Fasting     Vitamin D Deficiency   2. Hypothyroidism, unspecified type E03.9 levothyroxine (SYNTHROID/LEVOTHROID) 150 MCG tablet     TSH     T4, free    Trial of switch to synthroid to help with control of symptoms, labs 6 weeks thereafter   3. Vitamin D deficiency E55.9 Vitamin D Deficiency   4. Screening for malignant neoplasm of cervix Z12.4 Pap imaged thin layer screen with HPV - recommended age 30 - 65 years (select HPV order below)     HPV High Risk Types DNA Cervical       COUNSELING:  Special attention given to:        Regular exercise       Healthy diet/nutrition       Vision screening       Osteoporosis Prevention/Bone Health         reports that she has never smoked. She has never used smokeless tobacco.    Estimated body mass index is 18.9 kg/(m^2) as calculated from the following:    Height as of this encounter: 5' 2.5\" (1.588 m).    Weight as of this encounter: 105 lb (47.6 kg).       Counseling Resources:  ATP IV Guidelines  Pooled Cohorts Equation Calculator  Breast Cancer Risk Calculator  FRAX Risk Assessment  ICSI Preventive Guidelines  Dietary Guidelines for Americans, 2010  USDA's MyPlate  ASA Prophylaxis  Lung CA Screening    Ana Ryan MD  [facility] [place]  "

## 2018-05-21 NOTE — PATIENT INSTRUCTIONS
Change to name brand synthroid and then come back for labs in about 6 weeks (fasting lab visit)        Preventive Health Recommendations  Female Ages 40 to 49    Yearly exam:     See your health care provider every year in order to  1. Review health changes.   2. Discuss preventive care.    3. Review your medicines if your doctor prescribed any.      Get a Pap test every three years (unless you have an abnormal result and your provider advises testing more often).      If you get Pap tests with HPV test, you only need to test every 5 years, unless you have an abnormal result. You do not need a Pap test if your uterus was removed (hysterectomy) and you have not had cancer.      You should be tested each year for STDs (sexually transmitted diseases), if you're at risk.       Ask your doctor if you should have a mammogram.      Have a colonoscopy (test for colon cancer) if someone in your family has had colon cancer or polyps before age 50.       Have a cholesterol test every 5 years.       Have a diabetes test (fasting glucose) after age 45. If you are at risk for diabetes, you should have this test every 3 years.    Shots: Get a flu shot each year. Get a tetanus shot every 10 years.     Nutrition:     Eat at least 5 servings of fruits and vegetables each day.    Eat whole-grain bread, whole-wheat pasta and brown rice instead of white grains and rice.    Talk to your provider about Calcium and Vitamin D.     Lifestyle    Exercise at least 150 minutes a week (an average of 30 minutes a day, 5 days a week). This will help you control your weight and prevent disease.    Limit alcohol to one drink per day.    No smoking.     Wear sunscreen to prevent skin cancer.    See your dentist every six months for an exam and cleaning.

## 2018-05-24 LAB
COPATH REPORT: NORMAL
PAP: NORMAL

## 2018-05-29 ENCOUNTER — RESULT FOLLOW UP (OUTPATIENT)
Dept: PEDIATRICS | Facility: CLINIC | Age: 43
End: 2018-05-29

## 2018-05-29 DIAGNOSIS — R87.810 CERVICAL HIGH RISK HPV (HUMAN PAPILLOMAVIRUS) TEST POSITIVE: ICD-10-CM

## 2018-05-29 LAB
FINAL DIAGNOSIS: ABNORMAL
HPV HR 12 DNA CVX QL NAA+PROBE: NEGATIVE
HPV16 DNA SPEC QL NAA+PROBE: POSITIVE
HPV18 DNA SPEC QL NAA+PROBE: NEGATIVE
SPECIMEN DESCRIPTION: ABNORMAL
SPECIMEN SOURCE CVX/VAG CYTO: ABNORMAL

## 2018-05-29 NOTE — PROGRESS NOTES
Hx of PREET 2 2006  LEEP 2006  10/08 LGSIL  11/08 colp WNL  4/09 pap WNL  2/10 pap WNL  4/11 pap WNL  04/13 pap WNL  05/05/14 Pap= Normal, Neg HPV. Repeat co-test 1 yr, if NIL/Neg could go to 3 yr co-test per guidelines.  05/18/15 Pap= Normal, Neg HPV. 3 yr co-testing per ASCCP guidelines  5/21/18 NIL, +HPV 16. Plan colp by 8/21/18 5/29/18 Left message to call back. Patient notified of results and recommendations. Information also sent through [employer].    7/23/18 [place] - PREET 1. Plan 1 yr co-test. Due by 7/23/19. Patient informed through [employer]. (LN)  6/4/19 Epithelial Cell Abnormality: Glandular Cell: Atypical Endocervical Cells, Neg HPV. Plan colp and EMB by 9/4/19.  6/12/19 Left message to call back.   6/13/19 Pt notified. ([place])  6/16/19 [place] bx PREET 1, ECC: neg. EMB: neg. Plan: cotest in 1 yr.

## 2018-07-12 DIAGNOSIS — Z00.00 ROUTINE GENERAL MEDICAL EXAMINATION AT A HEALTH CARE FACILITY: ICD-10-CM

## 2018-07-12 DIAGNOSIS — E03.9 HYPOTHYROIDISM, UNSPECIFIED TYPE: ICD-10-CM

## 2018-07-12 DIAGNOSIS — E55.9 VITAMIN D DEFICIENCY: ICD-10-CM

## 2018-07-12 LAB
ALBUMIN SERPL-MCNC: 3.8 G/DL (ref 3.4–5)
ALP SERPL-CCNC: 40 U/L (ref 40–150)
ALT SERPL W P-5'-P-CCNC: 25 U/L (ref 0–50)
ANION GAP SERPL CALCULATED.3IONS-SCNC: 9 MMOL/L (ref 3–14)
AST SERPL W P-5'-P-CCNC: 12 U/L (ref 0–45)
BILIRUB SERPL-MCNC: 0.5 MG/DL (ref 0.2–1.3)
BUN SERPL-MCNC: 12 MG/DL (ref 7–30)
CALCIUM SERPL-MCNC: 8.6 MG/DL (ref 8.5–10.1)
CHLORIDE SERPL-SCNC: 105 MMOL/L (ref 94–109)
CHOLEST SERPL-MCNC: 183 MG/DL
CO2 SERPL-SCNC: 24 MMOL/L (ref 20–32)
CREAT SERPL-MCNC: 0.7 MG/DL (ref 0.52–1.04)
GFR SERPL CREATININE-BSD FRML MDRD: >90 ML/MIN/1.7M2
GLUCOSE SERPL-MCNC: 90 MG/DL (ref 70–99)
HDLC SERPL-MCNC: 81 MG/DL
LDLC SERPL CALC-MCNC: 94 MG/DL
NONHDLC SERPL-MCNC: 102 MG/DL
POTASSIUM SERPL-SCNC: 3.8 MMOL/L (ref 3.4–5.3)
PROT SERPL-MCNC: 7.2 G/DL (ref 6.8–8.8)
SODIUM SERPL-SCNC: 138 MMOL/L (ref 133–144)
T4 FREE SERPL-MCNC: 1.36 NG/DL (ref 0.76–1.46)
TRIGL SERPL-MCNC: 38 MG/DL
TSH SERPL DL<=0.005 MIU/L-ACNC: 1.48 MU/L (ref 0.4–4)

## 2018-07-12 PROCEDURE — 80053 COMPREHEN METABOLIC PANEL: CPT | Performed by: PEDIATRICS

## 2018-07-12 PROCEDURE — 82306 VITAMIN D 25 HYDROXY: CPT | Performed by: PEDIATRICS

## 2018-07-12 PROCEDURE — 80061 LIPID PANEL: CPT | Performed by: PEDIATRICS

## 2018-07-12 PROCEDURE — 36415 COLL VENOUS BLD VENIPUNCTURE: CPT | Performed by: PEDIATRICS

## 2018-07-12 PROCEDURE — 84439 ASSAY OF FREE THYROXINE: CPT | Performed by: PEDIATRICS

## 2018-07-12 PROCEDURE — 84443 ASSAY THYROID STIM HORMONE: CPT | Performed by: PEDIATRICS

## 2018-07-13 LAB — DEPRECATED CALCIDIOL+CALCIFEROL SERPL-MC: 53 UG/L (ref 20–75)

## 2018-07-23 ENCOUNTER — OFFICE VISIT (OUTPATIENT)
Dept: OBGYN | Facility: CLINIC | Age: 43
End: 2018-07-23
Payer: COMMERCIAL

## 2018-07-23 VITALS — DIASTOLIC BLOOD PRESSURE: 68 MMHG | SYSTOLIC BLOOD PRESSURE: 104 MMHG | WEIGHT: 103.6 LBS | BODY MASS INDEX: 18.65 KG/M2

## 2018-07-23 DIAGNOSIS — B97.7 HIGH RISK HPV INFECTION: Primary | ICD-10-CM

## 2018-07-23 PROCEDURE — 57454 BX/CURETT OF CERVIX W/SCOPE: CPT | Performed by: OBSTETRICS & GYNECOLOGY

## 2018-07-23 PROCEDURE — 88305 TISSUE EXAM BY PATHOLOGIST: CPT | Performed by: OBSTETRICS & GYNECOLOGY

## 2018-07-23 NOTE — PROGRESS NOTES
INDICATIONS:                                                    Jayleen Mccabe is a 43 year old female who had a recent NIL pap.  HPV positive. She does have a history of prior abnormal pap and underwent LEEP in 2006 following an ASC-H pap. Here today for colposcopy. Discussed indication, risks of infection and bleeding.    Was a consent obtained?  Yes    Lab Results   Component Value Date    PAP NIL, +HPV 16 05/21/2018    PAP NIL, HPV negative 05/18/2015    PAP NIL 05/05/2014     Before the procedure, it was ensured that the patient was educated regarding the nature of her findings to date, the implications of them, and what was to be done. She has been made aware of the role of HPV, the natural history of infection, ways to minimize her future risk, the effect of HPV on the cervix, and treatment options available should they be indicated. The details of the colposcopic procedure were reviewed, as well as the risks of pain, infection and bleeding. All questions were answered before proceeding, and informed consent was therefore obtained.    PROCEDURE:                                                    /68 (BP Location: Right arm, Patient Position: Chair, Cuff Size: Adult Regular)  Wt 103 lb 9.6 oz (47 kg)  LMP 07/13/2018  BMI 18.65 kg/m2   Cervix is stained with acetic acid and viewed colposcopically. Squamocolumnar junction is visualized in it's entirity. no visible lesions . Lugol's applied with absent uptake at 12 o'clock. Biopsy done at 12 o'clock. Endocervical curretage Done     POST PROCEDURE:                                                      IMPRESSION: colposcopy adequate and HPV    PLAN : Await the results of the biopsies.  Repeat pap in 12 months.  She  tolerated the procedure well. There were no complications. Patient was discharged in stable condition. This patient would e an appropriate candidate for office based excisional procedure if indicated by biopsy results.    Patient advised to call  the clinic if excessive bleeding, pelvic pain, or fever.     Follow-up plan based on pathology results.    Oxana Rios MD

## 2018-07-23 NOTE — NURSING NOTE
"  Chief Complaint   Patient presents with     Colposcopy     pap on 05/21/18- was NIL with positive HPV 16       Initial /68 (BP Location: Right arm, Patient Position: Chair, Cuff Size: Adult Regular)  Wt 103 lb 9.6 oz (47 kg)  LMP 07/13/2018  BMI 18.65 kg/m2 Estimated body mass index is 18.65 kg/(m^2) as calculated from the following:    Height as of 5/21/18: 5' 2.5\" (1.588 m).    Weight as of this encounter: 103 lb 9.6 oz (47 kg).  Medication Reconciliation: complete      Carrol Brewster CMA      "

## 2018-07-23 NOTE — MR AVS SNAPSHOT
After Visit Summary   7/23/2018    Jayleen Mccabe    MRN: [id number]           Patient Information     Date Of Birth          1975        Visit Information        Provider Department      7/23/2018 8:45 AM [facility]        Today's Diagnoses     High risk HPV infection    -  1       Follow-ups after your visit        Who to contact     If you have questions or need follow up information about today's clinic visit or your schedule please contact [facility] directly at [phone].  Normal or non-critical lab and imaging results will be communicated to you by MyChart, letter or phone within 4 business days after the clinic has received the results. If you do not hear from us within 7 days, please contact the clinic through [employer] or phone. If you have a critical or abnormal lab result, we will notify you by phone as soon as possible.  Submit refill requests through [employer] or call your pharmacy and they will forward the refill request to us. Please allow 3 business days for your refill to be completed.          Additional Information About Your Visit        MyChart Information     [employer] gives you secure access to your electronic health record. If you see a primary care provider, you can also send messages to your care team and make appointments. If you have questions, please call your primary care clinic.  If you do not have a primary care provider, please call [phone] and they will assist you.        Care EveryWhere ID     This is your Care EveryWhere ID. This could be used by other organizations to access your [place] medical records  [id number]        Your Vitals Were     Last Period BMI (Body Mass Index)                07/13/2018 18.65 kg/m2           Blood Pressure from Last 3 Encounters:   07/23/18 104/68   05/21/18 90/58   04/13/17 98/62    Weight from Last 3 Encounters:   07/23/18 103 lb 9.6 oz (47 kg)   05/21/18 105 lb (47.6 kg)    04/13/17 100 lb (45.4 kg)              We Performed the Following     COLP CERVIX/UPPER VAGINA W BX CERVIX/ENDOCERV CURETT        Primary Care Provider Office Phone # Fax #    Ana Ryan [phone] [address] [place] [address]        Equal Access to Services     [facility]: Hadii aad ku hadasho Soomaali, waaxda luqadaha, qaybta kaalmada adeegyada, waxay idiin hayaan adeeg kharash la'aan . So [facility] [phone].    ATENCIÓN: Si habla español, tiene a gr disposición servicios gratuitos de asistencia lingüística. Llame al [phone].    We comply with applicable federal civil rights laws and [place] laws. We do not discriminate on the basis of race, color, national origin, age, disability, sex, sexual orientation, or gender identity.            Thank you!     Thank you for choosing [facility] [place]  for your care. Our goal is always to provide you with excellent care. Hearing back from our patients is one way we can continue to improve our services. Please take a few minutes to complete the written survey that you may receive in the mail after your visit with us. Thank you!             Your Updated Medication List - Protect others around you: Learn how to safely use, store and throw away your medicines at www.disposemymeds.org.          This list is accurate as of 7/23/18  9:31 AM.  Always use your most recent med list.                   Brand Name Dispense Instructions for use Diagnosis    levothyroxine 150 MCG tablet    SYNTHROID/LEVOTHROID    90 tablet    Take 1 tablet (150 mcg) by mouth daily    Hypothyroidism, unspecified type

## 2018-07-24 LAB — COPATH REPORT: NORMAL

## 2018-07-29 ENCOUNTER — OFFICE VISIT (OUTPATIENT)
Dept: URGENT CARE | Facility: URGENT CARE | Age: 43
End: 2018-07-29
Payer: COMMERCIAL

## 2018-07-29 VITALS
WEIGHT: 106 LBS | SYSTOLIC BLOOD PRESSURE: 110 MMHG | TEMPERATURE: 98.9 F | OXYGEN SATURATION: 97 % | DIASTOLIC BLOOD PRESSURE: 76 MMHG | BODY MASS INDEX: 19.08 KG/M2 | HEART RATE: 73 BPM

## 2018-07-29 DIAGNOSIS — S01.80XA OPEN WOUND OF FOREHEAD, INITIAL ENCOUNTER: Primary | ICD-10-CM

## 2018-07-29 PROCEDURE — 12013 RPR F/E/E/N/L/M 2.6-5.0 CM: CPT | Performed by: FAMILY MEDICINE

## 2018-07-29 NOTE — MR AVS SNAPSHOT
After Visit Summary   7/29/2018    Jayleen Mccabe    MRN: [id number]           Patient Information     Date Of Birth          1975        Visit Information        Provider Department      7/29/2018 7:00 PM Mohsen Levi MD [facility]        Today's Diagnoses     Open wound of forehead, initial encounter    -  1      Care Instructions    Suture removal in 5-7 days                     Wound Closure and Wound Care  What is wound closure?   Wounds heal more quickly and with less risk of infection and scarring when the wound is cleaned and the wound edges are held together (closed). Scrapes, scratches, puncture wounds, and shallow cuts may need only cleaning, ointment, and a bandage. Some cuts may need to be closed with tape strips called Steri-Strips or tissue adhesive liquid (skin glue). If a cut or surgical incision is deep, very long, jagged, or under a lot of tension (such as a cut over a joint), stitches (also called sutures) or staples may be needed to close the wound.   How do I take care of my wound and sutures?   If you get an accidental cut, put pressure on the wound with a gauze pad or clean cloth right away to stop the bleeding. Then gently but thoroughly wash it with soap and cool water. Soapy water can be used around, but not in the cut. Try to remove all dirt and debris but do not scrub vigorously. If you decide to get medical treatment, cover the wound and apply pressure as needed to control bleeding while traveling to your healthcare provider's office, urgent care clinic, or emergency room.   After a wound is closed by your healthcare provider, the wound and the area around it must be kept clean and dry. The care of a stapled wound is similar to the care of a sutured wound. There are minor differences in caring for a wound closed with skin glue.   Do not let a wound closed with stitches or staples get wet for the first 24 hours. After 24 hours, you can shower or you can  "clean the wound with hydrogen peroxide or gently wash it with soap and warm water twice a day.   If your wound was closed with skin glue, keep the wound dry for the first 4 hours after the skin glue was put on. After the first 4 hours, you may occasionally and briefly wet the wound in the shower. You can clean the wound with hydrogen peroxide or gently wash it with soap and water twice a day.   If your wound is closed with Steri-Strips, they may be more likely to separate if they get wet. Keep them dry for the first few days while you're in the shower or bath.   Do not soak or scrub the wound. Do not take a bath, go swimming, or use a hot tub.   If recommended by your healthcare provider, you may put a small amount of antibiotic ointment on the wound each time you clean it. This can prevent infection. It will also help keep bandages from sticking to the wound. If a rash appears, stop using the ointment. If your wound is closed with skin glue, do not put liquid, antibiotic ointment, or any other product on the wound while the adhesive is in place. It may loosen the film before the wound is healed.   Make sure the wound is kept dry between washings. After showering or bathing, gently pat the wound dry with a soft towel.   Your healthcare provider may recommend that you cover the wound with gauze or a new, bandage to keep it from getting dirty. Be sure to keep the bandage dry. Put on a new bandage after cleaning the wound of if the old one gets dirty or wet.   Your healthcare provider may recommend leaving the wound \"open to air\" and not covered by a bandage while you sleep to help speed up the healing process. If the wound was closed with skin glue, you do not need a bandage.   For the first 1 or 2 days keep the area propped up higher than your heart. This will help lessen your pain and any swelling.   Protect the wound from repeat injury until the skin has had time to heal.   Protect the wound from a lot of exposure " to sunlight or tanning lamps while skin glue is in place. Wounds exposed to the sun can become red, while scars that have not been exposed to the sun usually turn white after a period of time.   Do not scratch, rub, or pick at your stitches, staples, or skin glue. This may cause them to loosen before the wound is healed.   Avoid activities that will make you sweat a lot until the skin glue has naturally fallen off or the stitches or staples have been removed.   Any wound can become infected. When you are cleaning your wound, look for these signs of infection:   increased redness   red streaks   increased swelling   increased pain or tenderness   pus or other drainage   warmth in the area of the wound   fever.   Contact your provider if you see any signs of infection.   If your wound was accidental, be sure to ask if a tetanus booster is needed. Treatment of accidental wounds may include taking an oral antibiotic to help prevent infection. Be sure to take the medicine until it is completely gone. Do not stop taking it just because the wound looks like it is healing well.   When are stitches, staples, or other types of wound closures removed?   Steri-Strips are usually left on until they fall off. If they have not fallen off after 2 weeks, they should be removed. Skin glue usually falls off on its own in 5 to 10 days.   For deep cuts the first stitches are placed under the skin. These stitches are made of materials that dissolve and do not need to be removed. Sutures or staples on the surface of the skin need to be removed by your healthcare provider 5 to 14 days after they are put in. The length of time depends on where the cut is. Sutures in wounds on the face usually can be removed after 5 to 7 days. In areas of high stress, such as hands, knees, or elbows, the sutures must stay in 10 to 14 days. Your provider will tell you when you should come to the office for removal of your sutures or staples. Do NOT remove  sutures or staples yourself unless your provider instructs you to do so. Staples are removed using a special tool. If you don't have the tool, don't try to remove the staples.   When should I call my healthcare provider?   Some swelling, redness, and pain are common with all wounds and normally go away as the wound heals.   Call your provider right away if:   You start to have any signs or symptoms of infection. These include:   Your skin is redder or more painful.   You have red streaks from the wound going toward your heart.   The wound area is very warm to touch.   You have pus or other fluid coming from the wound area.   You have a fever higher than 101.5? F (38.6? C).   You have chills, nausea, vomiting, or muscle aches.   The wound seems to be opening up or you notice any drainage.   The wound bleeds for more than 10 minutes.   The stitches or staples are loose.   The skin glue is loosening before it is supposed to.   You have any symptoms that worry you.     Published by [employer].  This content is reviewed periodically and is subject to change as new health information becomes available. The information is intended to inform and educate and is not a replacement for medical evaluation, advice, diagnosis or treatment by a healthcare professional.   Written by Rao London MD.   ? 2010 [employer] and/or its affiliates. All Rights Reserved.   Copyright   Clinical Reference Systems 2011                Follow-ups after your visit        Who to contact     If you have questions or need follow up information about today's clinic visit or your schedule please contact [facility] directly at [phone].  Normal or non-critical lab and imaging results will be communicated to you by MyChart, letter or phone within 4 business days after the clinic has received the results. If you do not hear from us within 7 days, please contact the clinic through MyChart or phone. If you have a critical or  abnormal lab result, we will notify you by phone as soon as possible.  Submit refill requests through [employer] or call your pharmacy and they will forward the refill request to us. Please allow 3 business days for your refill to be completed.          Additional Information About Your Visit        [employer] Information     [employer] gives you secure access to your electronic health record. If you see a primary care provider, you can also send messages to your care team and make appointments. If you have questions, please call your primary care clinic.  If you do not have a primary care provider, please call [phone] and they will assist you.        Care EveryWhere ID     This is your Care EveryWhere ID. This could be used by other organizations to access your [facility] medical records  [id number]        Your Vitals Were     Pulse Temperature Last Period Pulse Oximetry BMI (Body Mass Index)       73 98.9  F (37.2  C) (Oral) 07/13/2018 97% 19.08 kg/m2        Blood Pressure from Last 3 Encounters:   07/29/18 110/76   07/23/18 104/68   05/21/18 90/58    Weight from Last 3 Encounters:   07/29/18 106 lb (48.1 kg)   07/23/18 103 lb 9.6 oz (47 kg)   05/21/18 105 lb (47.6 kg)              We Performed the Following     REPR SUPERF WND FACE 2.6-5 [25047]        Primary Care Provider Office Phone # Fax #    Ana Ryan [phone] [address] [place] [address]        Equal Access to Services     [facility]: Hadii aad ku hadasho Soomaali, waaxda luqadaha, qaybta kaalmada tuckeregyada, dawn salvador . So [facility] [phone].    ATENCIÓN: Si habla español, tiene a gr disposición servicios gratuitos de asistencia lingüística. Llame al [phone].    We comply with applicable federal civil rights laws and [place] laws. We do not discriminate on the basis of race, color, national origin, age, disability, sex, sexual orientation, or gender identity.             Thank you!     Thank you for choosing [facility]  for your care. Our goal is always to provide you with excellent care. Hearing back from our patients is one way we can continue to improve our services. Please take a few minutes to complete the written survey that you may receive in the mail after your visit with us. Thank you!             Your Updated Medication List - Protect others around you: Learn how to safely use, store and throw away your medicines at www.disposemymeds.org.          This list is accurate as of 7/29/18  8:02 PM.  Always use your most recent med list.                   Brand Name Dispense Instructions for use Diagnosis    levothyroxine 150 MCG tablet    SYNTHROID/LEVOTHROID    90 tablet    Take 1 tablet (150 mcg) by mouth daily    Hypothyroidism, unspecified type

## 2018-07-30 NOTE — PROGRESS NOTES
SUBJECTIVE:     Chief Complaint   Patient presents with     Urgent Care     Laceration     start today-1 hour ago sx head hit rim of plastic yard waste bin, above right eye brown laceration, headache but not vision, tenatus (10/5/2010)  tx cleaned with cold water and bandaged      Jayleen Mccabe is a 43 year old female who presents to the clinic with a laceration on the left forehead slightly above eyebrow sustained 1 1/2 hour(s) ago.  This is a non-work related and accidental injury.    Mechanism of injury: rim of yard waste container.    Associated symptoms: Denies loss of consciousness, vomiting or confusion.  Denies numbness, weakness, or loss of function  Last tetanus booster within 10 years: yes, 2010    EXAM:   The patient appears today in alert,no apparent distress distress  VITALS: /76 (BP Location: Right arm, Patient Position: Chair, Cuff Size: Adult Regular)  Pulse 73  Temp 98.9  F (37.2  C) (Oral)  Wt 106 lb (48.1 kg)  LMP 07/13/2018  SpO2 97%  BMI 19.08 kg/m2    Size of laceration: 3 centimeters  Characteristics of the laceration: bleeding- mild, clean, straight and angled  Tendon function intact: not applicable  Sensation to light touch intact: yes  Pulses intact: not applicable  Picture included in patient's chart: no    Assessment:  Open wound of forehead, initial encounter    PLAN:  PROCEDURE NOTE::  Wound was locally injected with 4 cc's of Lidocaine 1% with epinephrine  Good anesthesia was obtained  Prepped and draped in the usual sterile fashion  Wound cleaned with betadine/saline solution  Wound cleaned with Shriley-Clens  Laceration was closed using 10 6-0 nylon interrupted sutures    After care instructions:  Keep wound clean and dry for the next 24-48 hours  Sutures out in 5-7 days  Signs of infection discussed today  Apply anti-bacterial ointment for 3-5 days  Discussed the probability of scarring    Mohsen Levi MD  July 29, 2018 8:06 PM

## 2018-07-30 NOTE — PATIENT INSTRUCTIONS
Suture removal in 5-7 days                     Wound Closure and Wound Care  What is wound closure?   Wounds heal more quickly and with less risk of infection and scarring when the wound is cleaned and the wound edges are held together (closed). Scrapes, scratches, puncture wounds, and shallow cuts may need only cleaning, ointment, and a bandage. Some cuts may need to be closed with tape strips called Steri-Strips or tissue adhesive liquid (skin glue). If a cut or surgical incision is deep, very long, jagged, or under a lot of tension (such as a cut over a joint), stitches (also called sutures) or staples may be needed to close the wound.   How do I take care of my wound and sutures?   If you get an accidental cut, put pressure on the wound with a gauze pad or clean cloth right away to stop the bleeding. Then gently but thoroughly wash it with soap and cool water. Soapy water can be used around, but not in the cut. Try to remove all dirt and debris but do not scrub vigorously. If you decide to get medical treatment, cover the wound and apply pressure as needed to control bleeding while traveling to your healthcare provider's office, urgent care clinic, or emergency room.   After a wound is closed by your healthcare provider, the wound and the area around it must be kept clean and dry. The care of a stapled wound is similar to the care of a sutured wound. There are minor differences in caring for a wound closed with skin glue.   Do not let a wound closed with stitches or staples get wet for the first 24 hours. After 24 hours, you can shower or you can clean the wound with hydrogen peroxide or gently wash it with soap and warm water twice a day.   If your wound was closed with skin glue, keep the wound dry for the first 4 hours after the skin glue was put on. After the first 4 hours, you may occasionally and briefly wet the wound in the shower. You can clean the wound with hydrogen peroxide or gently wash it with soap  "and water twice a day.   If your wound is closed with Steri-Strips, they may be more likely to separate if they get wet. Keep them dry for the first few days while you're in the shower or bath.   Do not soak or scrub the wound. Do not take a bath, go swimming, or use a hot tub.   If recommended by your healthcare provider, you may put a small amount of antibiotic ointment on the wound each time you clean it. This can prevent infection. It will also help keep bandages from sticking to the wound. If a rash appears, stop using the ointment. If your wound is closed with skin glue, do not put liquid, antibiotic ointment, or any other product on the wound while the adhesive is in place. It may loosen the film before the wound is healed.   Make sure the wound is kept dry between washings. After showering or bathing, gently pat the wound dry with a soft towel.   Your healthcare provider may recommend that you cover the wound with gauze or a new, bandage to keep it from getting dirty. Be sure to keep the bandage dry. Put on a new bandage after cleaning the wound of if the old one gets dirty or wet.   Your healthcare provider may recommend leaving the wound \"open to air\" and not covered by a bandage while you sleep to help speed up the healing process. If the wound was closed with skin glue, you do not need a bandage.   For the first 1 or 2 days keep the area propped up higher than your heart. This will help lessen your pain and any swelling.   Protect the wound from repeat injury until the skin has had time to heal.   Protect the wound from a lot of exposure to sunlight or tanning lamps while skin glue is in place. Wounds exposed to the sun can become red, while scars that have not been exposed to the sun usually turn white after a period of time.   Do not scratch, rub, or pick at your stitches, staples, or skin glue. This may cause them to loosen before the wound is healed.   Avoid activities that will make you sweat a lot " until the skin glue has naturally fallen off or the stitches or staples have been removed.   Any wound can become infected. When you are cleaning your wound, look for these signs of infection:   increased redness   red streaks   increased swelling   increased pain or tenderness   pus or other drainage   warmth in the area of the wound   fever.   Contact your provider if you see any signs of infection.   If your wound was accidental, be sure to ask if a tetanus booster is needed. Treatment of accidental wounds may include taking an oral antibiotic to help prevent infection. Be sure to take the medicine until it is completely gone. Do not stop taking it just because the wound looks like it is healing well.   When are stitches, staples, or other types of wound closures removed?   Steri-Strips are usually left on until they fall off. If they have not fallen off after 2 weeks, they should be removed. Skin glue usually falls off on its own in 5 to 10 days.   For deep cuts the first stitches are placed under the skin. These stitches are made of materials that dissolve and do not need to be removed. Sutures or staples on the surface of the skin need to be removed by your healthcare provider 5 to 14 days after they are put in. The length of time depends on where the cut is. Sutures in wounds on the face usually can be removed after 5 to 7 days. In areas of high stress, such as hands, knees, or elbows, the sutures must stay in 10 to 14 days. Your provider will tell you when you should come to the office for removal of your sutures or staples. Do NOT remove sutures or staples yourself unless your provider instructs you to do so. Staples are removed using a special tool. If you don't have the tool, don't try to remove the staples.   When should I call my healthcare provider?   Some swelling, redness, and pain are common with all wounds and normally go away as the wound heals.   Call your provider right away if:   You start to  have any signs or symptoms of infection. These include:   Your skin is redder or more painful.   You have red streaks from the wound going toward your heart.   The wound area is very warm to touch.   You have pus or other fluid coming from the wound area.   You have a fever higher than 101.5? F (38.6? C).   You have chills, nausea, vomiting, or muscle aches.   The wound seems to be opening up or you notice any drainage.   The wound bleeds for more than 10 minutes.   The stitches or staples are loose.   The skin glue is loosening before it is supposed to.   You have any symptoms that worry you.     Published by [employer].  This content is reviewed periodically and is subject to change as new health information becomes available. The information is intended to inform and educate and is not a replacement for medical evaluation, advice, diagnosis or treatment by a healthcare professional.   Written by Rao London MD.   ? 2010 [employer] and/or its affiliates. All Rights Reserved.   Copyright   [employer] Reference Systems 2011

## 2018-08-03 ENCOUNTER — MYC MEDICAL ADVICE (OUTPATIENT)
Dept: PEDIATRICS | Facility: CLINIC | Age: 43
End: 2018-08-03

## 2018-08-06 ENCOUNTER — ALLIED HEALTH/NURSE VISIT (OUTPATIENT)
Dept: NURSING | Facility: CLINIC | Age: 43
End: 2018-08-06
Payer: COMMERCIAL

## 2018-08-06 DIAGNOSIS — Z48.02 ENCOUNTER FOR REMOVAL OF SUTURES: Primary | ICD-10-CM

## 2018-08-06 PROCEDURE — 99207 ZZC NO CHARGE NURSE ONLY: CPT

## 2018-08-06 NOTE — NURSING NOTE
Jayleen Mccabe presents to the clinic for removal of sutures. The patient has had sutures in place for 8 days. There has been no patient reported signs or symptoms of infection or drainage. 10  sutures are seen and located on the left eyebrow. Tetanus status is up to date. All sutures were easily removed today. Routine wound care discussed by the RN or provider. The patient will follow up as needed.    Susan Kaba RN

## 2018-08-06 NOTE — MR AVS SNAPSHOT
After Visit Summary   8/6/2018    Jayleen Mccabe    MRN: [id number]           Patient Information     Date Of Birth          1975        Visit Information        Provider Department      8/6/2018 9:30 AM EA RN [facility]        Today's Diagnoses     Encounter for removal of sutures    -  1       Follow-ups after your visit        Who to contact     If you have questions or need follow up information about today's clinic visit or your schedule please contact [facility] directly at [phone].  Normal or non-critical lab and imaging results will be communicated to you by [employer], letter or phone within 4 business days after the clinic has received the results. If you do not hear from us within 7 days, please contact the clinic through [employer] or phone. If you have a critical or abnormal lab result, we will notify you by phone as soon as possible.  Submit refill requests through [employer] or call your pharmacy and they will forward the refill request to us. Please allow 3 business days for your refill to be completed.          Additional Information About Your Visit        MyChart Information     [employer] gives you secure access to your electronic health record. If you see a primary care provider, you can also send messages to your care team and make appointments. If you have questions, please call your primary care clinic.  If you do not have a primary care provider, please call [phone] and they will assist you.        Care EveryWhere ID     This is your Care EveryWhere ID. This could be used by other organizations to access your [facility] medical records  [id number]        Your Vitals Were     Last Period                   07/13/2018            Blood Pressure from Last 3 Encounters:   07/29/18 110/76   07/23/18 104/68   05/21/18 90/58    Weight from Last 3 Encounters:   07/29/18 106 lb (48.1 kg)   07/23/18 103 lb 9.6 oz (47 kg)   05/21/18 105 lb (47.6 kg)               Today, you had the following     No orders found for display       Primary Care Provider Office Phone # Fax #    Ana Ryan [phone] [address] [place] [address]        Equal Access to Services     [facility] : Hadii al ku adrianeo Sojonnyali, waaxda luqadaha, qaybta kaalmada adekwasiyada, dawn joyan joel barakat lajonathanmina benitez. So [facility] [phone].    ATENCIÓN: Si habla español, tiene a gr disposición servicios gratuitos de asistencia lingüística. Llame al [phone].    We comply with applicable federal civil rights laws and [place] laws. We do not discriminate on the basis of race, color, national origin, age, disability, sex, sexual orientation, or gender identity.            Thank you!     Thank you for choosing [facility]  for your care. Our goal is always to provide you with excellent care. Hearing back from our patients is one way we can continue to improve our services. Please take a few minutes to complete the written survey that you may receive in the mail after your visit with us. Thank you!             Your Updated Medication List - Protect others around you: Learn how to safely use, store and throw away your medicines at www.disposemymeds.org.          This list is accurate as of 8/6/18 10:06 AM.  Always use your most recent med list.                   Brand Name Dispense Instructions for use Diagnosis    levothyroxine 150 MCG tablet    SYNTHROID/LEVOTHROID    90 tablet    Take 1 tablet (150 mcg) by mouth daily    Hypothyroidism, unspecified type

## 2019-04-12 ENCOUNTER — MYC MEDICAL ADVICE (OUTPATIENT)
Dept: PEDIATRICS | Facility: CLINIC | Age: 44
End: 2019-04-12

## 2019-06-04 ENCOUNTER — OFFICE VISIT (OUTPATIENT)
Dept: PEDIATRICS | Facility: CLINIC | Age: 44
End: 2019-06-04
Payer: COMMERCIAL

## 2019-06-04 VITALS
DIASTOLIC BLOOD PRESSURE: 64 MMHG | RESPIRATION RATE: 16 BRPM | BODY MASS INDEX: 18.61 KG/M2 | WEIGHT: 105 LBS | OXYGEN SATURATION: 99 % | HEIGHT: 63 IN | SYSTOLIC BLOOD PRESSURE: 110 MMHG | TEMPERATURE: 98.7 F | HEART RATE: 77 BPM

## 2019-06-04 DIAGNOSIS — Z12.4 CERVICAL CANCER SCREENING: ICD-10-CM

## 2019-06-04 DIAGNOSIS — N87.1 MODERATE CERVICAL DYSPLASIA: ICD-10-CM

## 2019-06-04 DIAGNOSIS — Z00.00 ROUTINE GENERAL MEDICAL EXAMINATION AT A HEALTH CARE FACILITY: Primary | ICD-10-CM

## 2019-06-04 DIAGNOSIS — E03.9 HYPOTHYROIDISM, UNSPECIFIED TYPE: ICD-10-CM

## 2019-06-04 DIAGNOSIS — B00.1 RECURRENT COLD SORES: ICD-10-CM

## 2019-06-04 DIAGNOSIS — Z12.31 VISIT FOR SCREENING MAMMOGRAM: ICD-10-CM

## 2019-06-04 DIAGNOSIS — E55.9 VITAMIN D DEFICIENCY: ICD-10-CM

## 2019-06-04 DIAGNOSIS — N87.0 DYSPLASIA OF CERVIX, LOW GRADE (CIN 1): ICD-10-CM

## 2019-06-04 PROCEDURE — G0124 SCREEN C/V THIN LAYER BY MD: HCPCS | Performed by: PEDIATRICS

## 2019-06-04 PROCEDURE — 87624 HPV HI-RISK TYP POOLED RSLT: CPT | Performed by: PEDIATRICS

## 2019-06-04 PROCEDURE — 84443 ASSAY THYROID STIM HORMONE: CPT | Performed by: PEDIATRICS

## 2019-06-04 PROCEDURE — 99396 PREV VISIT EST AGE 40-64: CPT | Performed by: PEDIATRICS

## 2019-06-04 PROCEDURE — 36415 COLL VENOUS BLD VENIPUNCTURE: CPT | Performed by: PEDIATRICS

## 2019-06-04 PROCEDURE — 84439 ASSAY OF FREE THYROXINE: CPT | Performed by: PEDIATRICS

## 2019-06-04 PROCEDURE — 82306 VITAMIN D 25 HYDROXY: CPT | Performed by: PEDIATRICS

## 2019-06-04 PROCEDURE — G0145 SCR C/V CYTO,THINLAYER,RESCR: HCPCS | Performed by: PEDIATRICS

## 2019-06-04 RX ORDER — LEVOTHYROXINE SODIUM 150 UG/1
150 TABLET ORAL DAILY
Qty: 90 TABLET | Refills: 3 | Status: SHIPPED | OUTPATIENT
Start: 2019-06-04 | End: 2019-06-06

## 2019-06-04 RX ORDER — VALACYCLOVIR HYDROCHLORIDE 1 G/1
2000 TABLET, FILM COATED ORAL 2 TIMES DAILY
Qty: 4 TABLET | Refills: 6 | Status: SHIPPED | OUTPATIENT
Start: 2019-06-04 | End: 2020-11-23

## 2019-06-04 ASSESSMENT — ENCOUNTER SYMPTOMS
MYALGIAS: 0
CONSTIPATION: 0
ARTHRALGIAS: 0
EYE PAIN: 0
COUGH: 0
BREAST MASS: 0
SHORTNESS OF BREATH: 0
ABDOMINAL PAIN: 0
DYSURIA: 0
CHILLS: 0
FEVER: 0
DIZZINESS: 0
NERVOUS/ANXIOUS: 0
WEAKNESS: 0
PARESTHESIAS: 0
HEARTBURN: 0
JOINT SWELLING: 0
HEMATOCHEZIA: 0
SORE THROAT: 0
DIARRHEA: 0
HEMATURIA: 0
FREQUENCY: 0
NAUSEA: 0
PALPITATIONS: 0
HEADACHES: 0

## 2019-06-04 ASSESSMENT — MIFFLIN-ST. JEOR: SCORE: 1080.64

## 2019-06-04 NOTE — PATIENT INSTRUCTIONS
Mammogram orders waiting for you when you are ready to schedule    Labs today - stop at the lab on your way out on the first floor    Trial of valtrex for cold sores - sent to your pharmacy    I'll keep you posted on if we need to make changes to your synthroid            Preventive Health Recommendations  Female Ages 40 to 49    Yearly exam:     See your health care provider every year in order to  1. Review health changes.   2. Discuss preventive care.    3. Review your medicines if your doctor prescribed any.      Get a Pap test every three years (unless you have an abnormal result and your provider advises testing more often).      If you get Pap tests with HPV test, you only need to test every 5 years, unless you have an abnormal result. You do not need a Pap test if your uterus was removed (hysterectomy) and you have not had cancer.      You should be tested each year for STDs (sexually transmitted diseases), if you're at risk.     Ask your doctor if you should have a mammogram.      Have a colonoscopy (test for colon cancer) if someone in your family has had colon cancer or polyps before age 50.       Have a cholesterol test every 5 years.       Have a diabetes test (fasting glucose) after age 45. If you are at risk for diabetes, you should have this test every 3 years.    Shots: Get a flu shot each year. Get a tetanus shot every 10 years.     Nutrition:     Eat at least 5 servings of fruits and vegetables each day.    Eat whole-grain bread, whole-wheat pasta and brown rice instead of white grains and rice.    Get adequate Calcium and Vitamin D.      Lifestyle    Exercise at least 150 minutes a week (an average of 30 minutes a day, 5 days a week). This will help you control your weight and prevent disease.    Limit alcohol to one drink per day.    No smoking.     Wear sunscreen to prevent skin cancer.    See your dentist every six months for an exam and cleaning.

## 2019-06-04 NOTE — PROGRESS NOTES
SUBJECTIVE:   CC: Jayleen Mccabe is an 44 year old woman who presents for preventive health visit.     Healthy Habits:     Getting at least 3 servings of Calcium per day:  Yes    Bi-annual eye exam:  Yes    Dental care twice a year:  Yes    Sleep apnea or symptoms of sleep apnea:  None    Diet:  Gluten-free/reduced    Frequency of exercise:  4-5 days/week    Duration of exercise:  30-45 minutes    Taking medications regularly:  Yes    Medication side effects:  None    PHQ-2 Total Score: 0    Patient has some questions regarding cold sores. Patient has some questions regarding periods.     Today's PHQ-2 Score:   PHQ-2 ( 1999 [employer]) 6/4/2019   Q1: Little interest or pleasure in doing things 0   Q2: Feeling down, depressed or hopeless 0   PHQ-2 Score 0   Q1: Little interest or pleasure in doing things Not at all   Q2: Feeling down, depressed or hopeless Not at all   PHQ-2 Score 0       Abuse: Current or Past(Physical, Sexual or Emotional)- No  Do you feel safe in your environment? Yes    Social History     Tobacco Use     Smoking status: Never Smoker     Smokeless tobacco: Never Used   Substance Use Topics     Alcohol use: No     Frequency: 2-4 times a month     Drinks per session: 1 or 2     Binge frequency: Never       Alcohol Use 6/4/2019   Prescreen: >3 drinks/day or >7 drinks/week? No   Prescreen: >3 drinks/day or >7 drinks/week? -   No flowsheet data found.    Reviewed orders with patient.  Reviewed health maintenance and updated orders accordingly - Yes    Mammogram Screening: Patient under age 50, mutual decision reflected in health maintenance.      Pertinent mammograms are reviewed under the imaging tab.  History of abnormal Pap smear: YES - updated in Problem List and Health Maintenance accordingly  PAP / HPV Latest Ref Rng & Units 5/21/2018 5/18/2015 5/5/2014   PAP - NIL NIL NIL   HPV 16 DNA NEG:Negative Positive(A) Negative -   HPV 18 DNA NEG:Negative Negative Negative -   OTHER HR HPV NEG:Negative  "Negative Negative -     Reviewed and updated as needed this visit by clinical staff  Tobacco  Allergies  Med Hx  Surg Hx  Fam Hx  Soc Hx        Reviewed and updated as needed this visit by Provider            Hypothyroidism - Has adjusted her synthroid dosing - now taking 1/2 tablet three days per week, 1 tablet the remaining day.  No new symptoms of high or low thyroid state.    Has period today - now getting every 3.5 weeks instead of every 4 weeks.  Normal flow. No hot flashes.    Cold sores - hasn't had since teenage years, but have returned this year.  Has tried taking L-lysine.  Now getting to be more recurrent - has had a few times.  No genital symptoms.    Had +HPV on her pap smear last year.  Had colposcopy, pathology showed CIN1.  Due for repeat pap today.    Review of Systems   Constitutional: Negative for chills and fever.   HENT: Negative for congestion, ear pain, hearing loss and sore throat.    Eyes: Negative for pain and visual disturbance.   Respiratory: Negative for cough and shortness of breath.    Cardiovascular: Negative for chest pain, palpitations and peripheral edema.   Gastrointestinal: Negative for abdominal pain, constipation, diarrhea, heartburn, hematochezia and nausea.   Breasts:  Negative for tenderness, breast mass and discharge.   Genitourinary: Negative for dysuria, frequency, genital sores, hematuria, pelvic pain, urgency, vaginal bleeding and vaginal discharge.   Musculoskeletal: Negative for arthralgias, joint swelling and myalgias.   Skin: Negative for rash.   Neurological: Negative for dizziness, weakness, headaches and paresthesias.   Psychiatric/Behavioral: Negative for mood changes. The patient is not nervous/anxious.         OBJECTIVE:   /64 (BP Location: Right arm, Patient Position: Sitting, Cuff Size: Adult Regular)   Pulse 77   Temp 98.7  F (37.1  C) (Oral)   Resp 16   Ht 1.593 m (5' 2.7\")   Wt 47.6 kg (105 lb)   LMP 06/04/2019   SpO2 99%   BMI 18.78 " kg/m    Physical Exam  GENERAL: healthy, alert and no distress  EYES: Eyes grossly normal to inspection, PERRL and conjunctivae and sclerae normal  HENT: ear canals and TM's normal, nose and mouth without ulcers or lesions  NECK: no adenopathy, no asymmetry, masses, or scars and thyroid normal to palpation  RESP: lungs clear to auscultation - no rales, rhonchi or wheezes  BREAST: normal without masses, tenderness or nipple discharge and no palpable axillary masses or adenopathy  CV: regular rate and rhythm, normal S1 S2, no S3 or S4, no murmur, click or rub, no peripheral edema and peripheral pulses strong  ABDOMEN: soft, nontender, no hepatosplenomegaly, no masses and bowel sounds normal   (female): normal female external genitalia, normal urethral meatus, vaginal mucosa pink, moist, well rugated, and normal cervix/adnexa/uterus without masses, blood from os - patient on her menses  MS: no gross musculoskeletal defects noted, no edema  SKIN: no suspicious lesions or rashes  NEURO: Normal strength and tone, mentation intact and speech normal  PSYCH: mentation appears normal, affect normal/bright    Diagnostic Test Results:  pending    ASSESSMENT/PLAN:       ICD-10-CM    1. Routine general medical examination at a health care facility Z00.00    2. Hypothyroidism, unspecified type E03.9 levothyroxine (SYNTHROID/LEVOTHROID) 150 MCG tablet     TSH     T4, free    Adjust synthroid as necessary based on lab results   3. Vitamin D deficiency E55.9 Vitamin D Deficiency   4. Visit for screening mammogram Z12.31 MA Screen Bilateral w/Faustino   5. Cervical cancer screening Z12.4 Pap imaged thin layer screen with HPV - recommended age 30 - 65 years (select HPV order below)     HPV High Risk Types DNA Cervical   6. Dysplasia of cervix, low grade (PREET 1) N87.0 Due for repeat pap today, no new vaginal symptoms   7. Recurrent cold sores B00.1 valACYclovir (VALTREX) 1000 mg tablet  Trial of valtrex for recurrent cold sores  "      COUNSELING:  Special attention given to:        Regular exercise       Healthy diet/nutrition       Vision screening    Estimated body mass index is 18.78 kg/m  as calculated from the following:    Height as of this encounter: 1.593 m (5' 2.7\").    Weight as of this encounter: 47.6 kg (105 lb).         reports that she has never smoked. She has never used smokeless tobacco.      Counseling Resources:  ATP IV Guidelines  Pooled Cohorts Equation Calculator  Breast Cancer Risk Calculator  FRAX Risk Assessment  ICSI Preventive Guidelines  Dietary Guidelines for Americans, 2010  USDA's MyPlate  ASA Prophylaxis  Lung CA Screening    Ana Ryan MD  [facility] [place]  "

## 2019-06-05 LAB
DEPRECATED CALCIDIOL+CALCIFEROL SERPL-MC: 33 UG/L (ref 20–75)
T4 FREE SERPL-MCNC: 1.37 NG/DL (ref 0.76–1.46)
TSH SERPL DL<=0.005 MIU/L-ACNC: 1.87 MU/L (ref 0.4–4)

## 2019-06-06 DIAGNOSIS — E03.9 HYPOTHYROIDISM, UNSPECIFIED TYPE: ICD-10-CM

## 2019-06-06 RX ORDER — LEVOTHYROXINE SODIUM 150 UG/1
TABLET ORAL
Qty: 90 TABLET | Refills: 3 | COMMUNITY
Start: 2019-06-06 | End: 2020-11-25

## 2019-06-10 LAB
COPATH REPORT: ABNORMAL
PAP: ABNORMAL

## 2019-06-11 LAB
FINAL DIAGNOSIS: NORMAL
HPV HR 12 DNA CVX QL NAA+PROBE: NEGATIVE
HPV16 DNA SPEC QL NAA+PROBE: NEGATIVE
HPV18 DNA SPEC QL NAA+PROBE: NEGATIVE
SPECIMEN DESCRIPTION: NORMAL
SPECIMEN SOURCE CVX/VAG CYTO: NORMAL

## 2019-06-19 ENCOUNTER — OFFICE VISIT (OUTPATIENT)
Dept: OBGYN | Facility: CLINIC | Age: 44
End: 2019-06-19
Payer: COMMERCIAL

## 2019-06-19 VITALS — BODY MASS INDEX: 18.78 KG/M2 | SYSTOLIC BLOOD PRESSURE: 102 MMHG | HEIGHT: 63 IN | DIASTOLIC BLOOD PRESSURE: 68 MMHG

## 2019-06-19 DIAGNOSIS — Z01.812 PRE-PROCEDURE LAB EXAM: ICD-10-CM

## 2019-06-19 DIAGNOSIS — N87.0 MILD DYSPLASIA OF CERVIX: ICD-10-CM

## 2019-06-19 DIAGNOSIS — N87.1 MODERATE CERVICAL DYSPLASIA: ICD-10-CM

## 2019-06-19 DIAGNOSIS — R87.619 ATYPICAL GLANDULAR CELLS OF UNDETERMINED SIGNIFICANCE (AGUS) ON CERVICAL PAP SMEAR: Primary | ICD-10-CM

## 2019-06-19 LAB — HCG UR QL: NEGATIVE

## 2019-06-19 PROCEDURE — 87491 CHLMYD TRACH DNA AMP PROBE: CPT | Performed by: OBSTETRICS & GYNECOLOGY

## 2019-06-19 PROCEDURE — 58110 BX DONE W/COLPOSCOPY ADD-ON: CPT | Performed by: OBSTETRICS & GYNECOLOGY

## 2019-06-19 PROCEDURE — 87591 N.GONORRHOEAE DNA AMP PROB: CPT | Performed by: OBSTETRICS & GYNECOLOGY

## 2019-06-19 PROCEDURE — 88305 TISSUE EXAM BY PATHOLOGIST: CPT | Performed by: OBSTETRICS & GYNECOLOGY

## 2019-06-19 PROCEDURE — 57454 BX/CURETT OF CERVIX W/SCOPE: CPT | Performed by: OBSTETRICS & GYNECOLOGY

## 2019-06-19 PROCEDURE — 81025 URINE PREGNANCY TEST: CPT | Performed by: OBSTETRICS & GYNECOLOGY

## 2019-06-19 NOTE — NURSING NOTE
"Chief Complaint   Patient presents with     Procedure     COLP and EMB       Initial /68   Ht 1.593 m (5' 2.7\")   LMP 2019   BMI 18.78 kg/m   Estimated body mass index is 18.78 kg/m  as calculated from the following:    Height as of this encounter: 1.593 m (5' 2.7\").    Weight as of 19: 47.6 kg (105 lb).  BP completed using cuff size: regular    Questioned patient about current smoking habits.  Pt. has never smoked.          The following HM Due: NONE      Leanna Bryan MA        "

## 2019-06-19 NOTE — PROGRESS NOTES
Colposcopy Note (with endometrial biopsy as noted below):    Past History:     Patient's last menstrual period was 2019.  Patient is not pregnant.     Previous Abnormal Pap Hx:  Abnormal Pap dated:  LUBNA, HPV Neg  Prior Colposcopy dated: 18 - PREET 1;  Also had Hx PREET 2 Rx'd LEEP   Prior Treatment dated: N/A    Indications:  Encounter Diagnoses   Name Primary?     Atypical glandular cells of undetermined significance (LUBNA) on cervical Pap smear Yes     Pre-procedure lab exam        PROCEDURE:  The procedure was explained, and informed consent obtained. The patient was placed in the dorsal lithotomy position. A vaginal speculum was placed. A GC/Chlamydia culture was obtained. Dilute acetic acid was applied to cervix. The exocervix was visualized. The transformation zone was visualized satisfactorily. Colposcopy was done with white light and with the Stepan light. Endocervical curettage was done. Biopsy done at the 12 o clock position(s), EMBx performed as noted below.  Colposcopy of vagina revealed: normal. The patient tolerated the procedure well. At the completion of the procedure, only scant bleeding was present. Hemostasis was achieved with Monsel's solution.    FINDINGS:  White epithelium @ 12 o clock position(s).  Punctation: absent  Mosaicism: absent    Physical Exam   Genitourinary:             Peocedure:  Endometrial Biopsy    Indications for procedure:  Evaluation of LUBNA on Pap    Pre-Procedure Medications:  None    The proposed procedure to diagnose the above condition was explained to the patient.  Risks, side effects, benefits, and alternatives were discussed. All questions were answered to patient's satisfaction. The patient gave informed consent.     After above colposcopic biopsies were performed, the cervix was cleansed with antiseptic solution.  The uterus was sounded to 8 cm.  The [place] Endometrial Suction Curette was used and 1 rotating pass(s) were made  between the fundus and the internal os. An adequate sample was obtained and sent to pathology.  Instruments were removed.  The patient experienced mild cramping during the procedure.  This subsided rapidly after instruments were removed.  The patient remained supine for a few moments after the procedure and had no other complications. No heavy bleeding was observed.        ASSESSMENT:  Encounter Diagnoses   Name Primary?     Atypical glandular cells of undetermined significance (LUBNA) on cervical Pap smear Yes     Pre-procedure lab exam        PLAN:  1)  If Bx shows PREET 1 or less, follow-up with Ob/Gyn per ASCCP Guideline in 1 year for Pap & HPV cotest at next annual exam.   The patient has given me explicit permission to leave a detailed message via [employer] if able.    2)  The patient was instructed to report any fever, cramping after 48 hours, or bleeding for 24-48 hours heavier than normal menses. OTC NSAIDs may be used for cramping PRN. Sexual relations may be resumed in 2-3 days, or after bleeding has stopped.   Pt. is to contact our office if she has not received the pathology report within 1-2 weeks of the procedure.    Procedure Planned:   If HGSIL, consider LEEP.    Enrique Raman MD

## 2019-06-21 PROBLEM — N87.0 MILD DYSPLASIA OF CERVIX: Status: ACTIVE | Noted: 2019-06-21

## 2019-06-21 LAB
C TRACH DNA SPEC QL NAA+PROBE: NEGATIVE
COPATH REPORT: NORMAL
N GONORRHOEA DNA SPEC QL NAA+PROBE: NEGATIVE
SPECIMEN SOURCE: NORMAL
SPECIMEN SOURCE: NORMAL

## 2019-06-21 NOTE — RESULT ENCOUNTER NOTE
Please advise patient her colposcopic biopsy from her cervix did only show mild dysplasia.  This is not cervical cancer.  It is a very mild pre-cancerous change that usually will resolve on its own without treatment in the course of a year or so.  Her endometrial biopsy was also normal.  Nothing else needs to be done at this time.  She needs to follow up in 1 year for her annual exam, and at that time a Pap with HPV DNA test will be done again from her cervix to check to see if the cervix cells are normal and the HPV virus has been eradicated.  If so she will do this again 1 year after that as well.  If after 2 consecutive Pap/HPV cotests a year apart are all normal, then she can resume routine Q 3 year screening.  She can let me know if she has any other questions.    Enrique Raman MD

## 2019-07-31 ENCOUNTER — OFFICE VISIT (OUTPATIENT)
Dept: OPTOMETRY | Facility: CLINIC | Age: 44
End: 2019-07-31
Payer: COMMERCIAL

## 2019-07-31 DIAGNOSIS — H52.4 PRESBYOPIA: ICD-10-CM

## 2019-07-31 DIAGNOSIS — H02.889 MEIBOMIAN GLAND DYSFUNCTION: ICD-10-CM

## 2019-07-31 DIAGNOSIS — H52.13 MYOPIA OF BOTH EYES WITH ASTIGMATISM: Primary | ICD-10-CM

## 2019-07-31 DIAGNOSIS — H52.203 MYOPIA OF BOTH EYES WITH ASTIGMATISM: Primary | ICD-10-CM

## 2019-07-31 PROCEDURE — 92014 COMPRE OPH EXAM EST PT 1/>: CPT | Performed by: OPTOMETRIST

## 2019-07-31 ASSESSMENT — REFRACTION_MANIFEST
OS_CYLINDER: +1.00
OD_SPHERE: -2.50
OS_AXIS: 095
OD_CYLINDER: +0.25
OS_SPHERE: -3.25
OD_ADD: +0.50
OD_AXIS: 040

## 2019-07-31 ASSESSMENT — CUP TO DISC RATIO
OS_RATIO: 0.35
OD_RATIO: 0.4

## 2019-07-31 ASSESSMENT — REFRACTION_WEARINGRX
OS_AXIS: 085
OD_AXIS: 090
OS_CYLINDER: +0.75
OD_SPHERE: -2.50
OS_SPHERE: -3.00
OD_CYLINDER: +0.50

## 2019-07-31 ASSESSMENT — VISUAL ACUITY
METHOD: SNELLEN - LINEAR
CORRECTION_TYPE: GLASSES
OD_CC: 20/20
OS_CC: 20/20
OS_SC: 20/30
OD_SC: 20/30
OD_CC+: -1

## 2019-07-31 ASSESSMENT — EXTERNAL EXAM - LEFT EYE: OS_EXAM: NORMAL

## 2019-07-31 ASSESSMENT — TONOMETRY
OD_IOP_MMHG: 16
OS_IOP_MMHG: 16
IOP_METHOD: APPLANATION

## 2019-07-31 ASSESSMENT — EXTERNAL EXAM - RIGHT EYE: OD_EXAM: NORMAL

## 2019-07-31 NOTE — PATIENT INSTRUCTIONS
Meibomian gland dysfunction or Posterior Blepharitis, is characterized by inflammation along both the uppper and lower eyelid margins. A single row of these glands is present in each lid with openings along the lid margins.  It is often found in association with skin conditions such as rosacea and seborrheic dermatitis.    Symptoms include:  ?Red eyes  ?Gritty or burning sensation  ?Excessive tearing  ?Itchy eyelids  ?Red, swollen eyelids  ?Crusting or matting of eyelashes in the morning  ?Light sensitivity  ?Blurred vision    It is important to keep cosmetics from blocking these oil glands. If blocked, they do not   excrete oil into the tear film, which causes the tears to evaporate quickly.   This may result in watery eyes.  There is also an increase of bacterial growth when the tear film is unstable, leading to further ocular surface inflammation.    Warm compresses are very beneficial to the normal functioning of the eye.  Warm compresses for 5-10 minutes twice daily and keeping the lid margins clean  and help maintain a good tear film.   Moisten a washcloth with hot water, or microwave for 10 seconds, being careful to not get the cloth too hot.   Then put the washcloth onto your eyelids for 5 minutes. It will cool quickly so a rice pack or eyemask that can be heated and laid on top of the washcloth will help retain the heat.    Omega 3 fatty acid supplements taken once to twice daily and artificial tears such as Soothe xp, Refresh optive , Retaine and systane balance are also an additional treatment to control inflammation and help soothe your eyes.

## 2019-07-31 NOTE — PROGRESS NOTES
Chief Complaint   Patient presents with     Annual Eye Exam        Last Eye Exam: 2017  Dilated Previously: Yes    What are you currently using to see?  glasses       Distance Vision Acuity: Satisfied with vision    Near Vision Acuity: Satisfied with vision while reading  unaided    Eye Comfort: good  Do you use eye drops? : No  Occupation or Hobbies: pharmacist at the              Medical, surgical and family histories reviewed and updated 7/31/2019.       OBJECTIVE: See Ophthalmology exam    ASSESSMENT:    ICD-10-CM    1. Myopia of both eyes with astigmatism H52.13     H52.203    2. Presbyopia H52.4    3. Meibomian gland dysfunction H02.889       PLAN:   Continue warm compresses / artificial tears as needed   No prescription change needed    Alexsandra Zavala OD

## 2019-07-31 NOTE — LETTER
7/31/2019         RE: Jayleen Mccabe  [address]  [place] [address]        Dear Colleague,    Thank you for referring your patient, Jayleen Mccabe, to the [facility]. Please see a copy of my visit note below.    error    Chief Complaint   Patient presents with     Annual Eye Exam        Last Eye Exam: 2017  Dilated Previously: Yes    What are you currently using to see?  glasses       Distance Vision Acuity: Satisfied with vision    Near Vision Acuity: Satisfied with vision while reading  unaided    Eye Comfort: good  Do you use eye drops? : No  Occupation or Hobbies: pharmacist at the              Medical, surgical and family histories reviewed and updated 7/31/2019.       OBJECTIVE: See Ophthalmology exam    ASSESSMENT:    ICD-10-CM    1. Myopia of both eyes with astigmatism H52.13     H52.203    2. Presbyopia H52.4    3. Meibomian gland dysfunction H02.889       PLAN:   Continue warm compresses / artificial tears as needed   No prescription change needed    Alexsandra Zavala OD     Again, thank you for allowing me to participate in the care of your patient.        Sincerely,        Alexsandra Zavala, OD

## 2019-08-14 ENCOUNTER — MYC MEDICAL ADVICE (OUTPATIENT)
Dept: PEDIATRICS | Facility: CLINIC | Age: 44
End: 2019-08-14

## 2019-10-04 ENCOUNTER — HEALTH MAINTENANCE LETTER (OUTPATIENT)
Age: 44
End: 2019-10-04

## 2020-09-23 ENCOUNTER — TELEPHONE (OUTPATIENT)
Dept: PEDIATRICS | Facility: CLINIC | Age: 45
End: 2020-09-23

## 2020-09-23 NOTE — TELEPHONE ENCOUNTER
Due to provider unavailability, patient's appointment 11/23/20 needs to be rescheduled. Left message and sent mychart message informing patient of this.  Requested call back to reschedule, and provided clinic number.    Concepción Mason

## 2020-11-08 ENCOUNTER — HEALTH MAINTENANCE LETTER (OUTPATIENT)
Age: 45
End: 2020-11-08

## 2020-11-23 ENCOUNTER — OFFICE VISIT (OUTPATIENT)
Dept: PEDIATRICS | Facility: CLINIC | Age: 45
End: 2020-11-23
Payer: COMMERCIAL

## 2020-11-23 VITALS
OXYGEN SATURATION: 97 % | BODY MASS INDEX: 18.96 KG/M2 | HEART RATE: 98 BPM | WEIGHT: 106 LBS | SYSTOLIC BLOOD PRESSURE: 112 MMHG | DIASTOLIC BLOOD PRESSURE: 64 MMHG

## 2020-11-23 DIAGNOSIS — Z12.31 VISIT FOR SCREENING MAMMOGRAM: ICD-10-CM

## 2020-11-23 DIAGNOSIS — Z00.00 ROUTINE GENERAL MEDICAL EXAMINATION AT A HEALTH CARE FACILITY: Primary | ICD-10-CM

## 2020-11-23 DIAGNOSIS — E03.9 HYPOTHYROIDISM, UNSPECIFIED TYPE: ICD-10-CM

## 2020-11-23 DIAGNOSIS — B00.1 RECURRENT COLD SORES: ICD-10-CM

## 2020-11-23 DIAGNOSIS — D64.9 ANEMIA, UNSPECIFIED TYPE: ICD-10-CM

## 2020-11-23 DIAGNOSIS — Z12.4 CERVICAL CANCER SCREENING: ICD-10-CM

## 2020-11-23 LAB
ERYTHROCYTE [DISTWIDTH] IN BLOOD BY AUTOMATED COUNT: 14 % (ref 10–15)
HCT VFR BLD AUTO: 37.1 % (ref 35–47)
HGB BLD-MCNC: 11.4 G/DL (ref 11.7–15.7)
MCH RBC QN AUTO: 27.1 PG (ref 26.5–33)
MCHC RBC AUTO-ENTMCNC: 30.7 G/DL (ref 31.5–36.5)
MCV RBC AUTO: 88 FL (ref 78–100)
PLATELET # BLD AUTO: 226 10E9/L (ref 150–450)
RBC # BLD AUTO: 4.2 10E12/L (ref 3.8–5.2)
WBC # BLD AUTO: 6.9 10E9/L (ref 4–11)

## 2020-11-23 PROCEDURE — 99396 PREV VISIT EST AGE 40-64: CPT | Performed by: PEDIATRICS

## 2020-11-23 PROCEDURE — 87624 HPV HI-RISK TYP POOLED RSLT: CPT | Performed by: PEDIATRICS

## 2020-11-23 PROCEDURE — G0145 SCR C/V CYTO,THINLAYER,RESCR: HCPCS | Performed by: PEDIATRICS

## 2020-11-23 PROCEDURE — 84443 ASSAY THYROID STIM HORMONE: CPT | Performed by: PEDIATRICS

## 2020-11-23 PROCEDURE — 85027 COMPLETE CBC AUTOMATED: CPT | Performed by: PEDIATRICS

## 2020-11-23 PROCEDURE — 84439 ASSAY OF FREE THYROXINE: CPT | Performed by: PEDIATRICS

## 2020-11-23 PROCEDURE — 36415 COLL VENOUS BLD VENIPUNCTURE: CPT | Performed by: PEDIATRICS

## 2020-11-23 RX ORDER — VALACYCLOVIR HYDROCHLORIDE 1 G/1
2000 TABLET, FILM COATED ORAL 2 TIMES DAILY
Qty: 4 TABLET | Refills: 6 | Status: SHIPPED | OUTPATIENT
Start: 2020-11-23 | End: 2021-01-08

## 2020-11-23 NOTE — PATIENT INSTRUCTIONS
Labs today    Schedule your mammogram when you are able    Stop in the pharmacy for a TdaP      Preventive Health Recommendations  Female Ages 40 to 49    Yearly exam:     See your health care provider every year in order to  1. Review health changes.   2. Discuss preventive care.    3. Review your medicines if your doctor prescribed any.      Get a Pap test every three years (unless you have an abnormal result and your provider advises testing more often).      If you get Pap tests with HPV test, you only need to test every 5 years, unless you have an abnormal result. You do not need a Pap test if your uterus was removed (hysterectomy) and you have not had cancer.      You should be tested each year for STDs (sexually transmitted diseases), if you're at risk.     Ask your doctor if you should have a mammogram.      Have a colonoscopy (test for colon cancer) if someone in your family has had colon cancer or polyps before age 50.       Have a cholesterol test every 5 years.       Have a diabetes test (fasting glucose) after age 45. If you are at risk for diabetes, you should have this test every 3 years.    Shots: Get a flu shot each year. Get a tetanus shot every 10 years.     Nutrition:     Eat at least 5 servings of fruits and vegetables each day.    Eat whole-grain bread, whole-wheat pasta and brown rice instead of white grains and rice.    Get adequate Calcium and Vitamin D.      Lifestyle    Exercise at least 150 minutes a week (an average of 30 minutes a day, 5 days a week). This will help you control your weight and prevent disease.    Limit alcohol to one drink per day.    No smoking.     Wear sunscreen to prevent skin cancer.    See your dentist every six months for an exam and cleaning.

## 2020-11-24 LAB
T4 FREE SERPL-MCNC: 0.93 NG/DL (ref 0.76–1.46)
TSH SERPL DL<=0.005 MIU/L-ACNC: 27.87 MU/L (ref 0.4–4)

## 2020-11-25 RX ORDER — LEVOTHYROXINE SODIUM 150 UG/1
TABLET ORAL
Qty: 90 TABLET | Refills: 3 | Status: SHIPPED | OUTPATIENT
Start: 2020-11-25 | End: 2021-09-26

## 2020-11-27 LAB
COPATH REPORT: NORMAL
PAP: NORMAL

## 2020-12-01 ENCOUNTER — PATIENT OUTREACH (OUTPATIENT)
Dept: PEDIATRICS | Facility: CLINIC | Age: 45
End: 2020-12-01

## 2020-12-01 DIAGNOSIS — R87.810 CERVICAL HIGH RISK HPV (HUMAN PAPILLOMAVIRUS) TEST POSITIVE: ICD-10-CM

## 2020-12-01 NOTE — TELEPHONE ENCOUNTER
Hx of PREET 2 2006  LEEP 2006  10/08 LGSIL  11/08 colp WNL  4/09 pap WNL  2/10 pap WNL  4/11 pap WNL  04/13 pap WNL  05/05/14 Pap= Normal, Neg HPV. Repeat co-test 1 yr, if NIL/Neg could go to 3 yr co-test per guidelines.  05/18/15 Pap= Normal, Neg HPV. 3 yr co-testing per ASCCP guidelines  5/21/18 NIL, +HPV 16  7/23/18 [place]- PREET 1. Plan 1 yr co-test  6/4/19 Epithelial Cell Abnormality: Glandular Cell: Atypical Endocervical Cells, Neg HPV. Plan colp and EMB.   6/16/19 [place] bx PREET 1, ECC: neg. EMB: neg. Plan: cotest in 1 yr.  11/23/20 NIL, +HPV 16. Plan [place] bef 2/23/21

## 2021-01-08 DIAGNOSIS — B00.1 RECURRENT COLD SORES: ICD-10-CM

## 2021-01-08 DIAGNOSIS — Z00.00 ROUTINE GENERAL MEDICAL EXAMINATION AT A HEALTH CARE FACILITY: Primary | ICD-10-CM

## 2021-01-08 RX ORDER — VALACYCLOVIR HYDROCHLORIDE 1 G/1
TABLET, FILM COATED ORAL
Qty: 4 TABLET | Refills: 6 | Status: SHIPPED | OUTPATIENT
Start: 2021-01-08 | End: 2021-09-22

## 2021-01-08 NOTE — TELEPHONE ENCOUNTER
Prescription approved per [facility].    Patient needs updated labs.   Please place orders for Creatine. Last done 2018.     Rosy Salcedo RN Flex

## 2021-01-21 PROBLEM — R87.810 CERVICAL HIGH RISK HPV (HUMAN PAPILLOMAVIRUS) TEST POSITIVE: Status: ACTIVE | Noted: 2019-05-21

## 2021-03-26 ENCOUNTER — OFFICE VISIT (OUTPATIENT)
Dept: OBGYN | Facility: CLINIC | Age: 46
End: 2021-03-26
Payer: COMMERCIAL

## 2021-03-26 VITALS — DIASTOLIC BLOOD PRESSURE: 62 MMHG | BODY MASS INDEX: 18.96 KG/M2 | WEIGHT: 106 LBS | SYSTOLIC BLOOD PRESSURE: 100 MMHG

## 2021-03-26 DIAGNOSIS — R87.810 CERVICAL HIGH RISK HPV (HUMAN PAPILLOMAVIRUS) TEST POSITIVE: Primary | ICD-10-CM

## 2021-03-26 DIAGNOSIS — Z11.3 SCREEN FOR STD (SEXUALLY TRANSMITTED DISEASE): ICD-10-CM

## 2021-03-26 DIAGNOSIS — Z01.812 PRE-PROCEDURE LAB EXAM: ICD-10-CM

## 2021-03-26 LAB — HCG UR QL: NEGATIVE

## 2021-03-26 PROCEDURE — 87491 CHLMYD TRACH DNA AMP PROBE: CPT | Performed by: OBSTETRICS & GYNECOLOGY

## 2021-03-26 PROCEDURE — 88305 TISSUE EXAM BY PATHOLOGIST: CPT | Performed by: PATHOLOGY

## 2021-03-26 PROCEDURE — 81025 URINE PREGNANCY TEST: CPT | Performed by: OBSTETRICS & GYNECOLOGY

## 2021-03-26 PROCEDURE — 57454 BX/CURETT OF CERVIX W/SCOPE: CPT | Performed by: OBSTETRICS & GYNECOLOGY

## 2021-03-26 PROCEDURE — 87591 N.GONORRHOEAE DNA AMP PROB: CPT | Performed by: OBSTETRICS & GYNECOLOGY

## 2021-03-26 NOTE — NURSING NOTE
"Chief Complaint   Patient presents with     Colposcopy     +HPV 16       Initial /62 (BP Location: Right arm, Patient Position: Sitting, Cuff Size: Adult Regular)   Wt 48.1 kg (106 lb)   LMP 03/15/2021 (Within Days)   BMI 18.96 kg/m   Estimated body mass index is 18.96 kg/m  as calculated from the following:    Height as of 19: 1.593 m (5' 2.7\").    Weight as of this encounter: 48.1 kg (106 lb).  BP completed using cuff size: regular    Questioned patient about current smoking habits.  Pt. has never smoked.          The following HM Due: NONE    Raf Zavala CMA                "

## 2021-03-26 NOTE — PROGRESS NOTES
Colposcopy Note    Past History:     Patient's last menstrual period was 03/15/2021 (within days).  Patient is not pregnant.     Previous Abnormal Pap Hx:  Hx of PREET 2   LEEP 2006  10/08 LGSIL   colp WNL   pap WNL  2/10 pap WNL   pap WNL   pap WNL  14 Pap= Normal, Neg HPV. Repeat co-test 1 yr, if NIL/Neg could go to 3 yr co-test per guidelines.  05/18/15 Pap= Normal, Neg HPV. 3 yr co-testing per ASCCP guidelines  18 NIL, +HPV 16  18 [place]- PREET 1. Plan 1 yr co-test  19 Epithelial Cell Abnormality: Glandular Cell: Atypical Endocervical Cells, Neg HPV. Plan colp and EMB.   19 [place] bx PREET 1, ECC: neg. EMB: neg. Plan: cotest in 1 yr.  20 NIL, +HPV 16.     Indications:  Encounter Diagnoses   Name Primary?     Cervical high risk HPV (human papillomavirus) test positive Yes     Pre-procedure lab exam        PROCEDURE:  The procedure was explained, and informed consent obtained. The patient was placed in the dorsal lithotomy position. A vaginal speculum was placed. A GC/Chlamydia culture was obtained. Dilute acetic acid was applied to cervix. The exocervix was visualized. The transformation zone was visualized satisfactorily. Colposcopy was done with white light and with the Stepan light. Endocervical curettage was done. Biopsy done at the 12 o clock position(s) Colposcopy of vagina revealed: normal. The patient tolerated the procedure well. At the completion of the procedure, only scant bleeding was present. Hemostasis was achieved with Monsel's solution.    FINDINGS:  White epithelium @ 12 o clock position(s).  Punctation: absent  Mosaicism: absent    Physical Exam  Genitourinary:              ASSESSMENT:  Encounter Diagnoses   Name Primary?     Cervical high risk HPV (human papillomavirus) test positive Yes     Pre-procedure lab exam        PLAN:  If Bx shows PREET 1 or less, follow-up with Ob/Gyn per ASCCP Guideline in 1 year for Pap & HPV cotest at next annual  exam.     Procedure Planned:   If HGSIL, consider Laser vaporization unless endoCx gland involvement in which case LEEP.    Enrique Raman MD

## 2021-03-27 LAB
C TRACH DNA SPEC QL NAA+PROBE: NEGATIVE
N GONORRHOEA DNA SPEC QL NAA+PROBE: NEGATIVE
SPECIMEN SOURCE: NORMAL
SPECIMEN SOURCE: NORMAL

## 2021-03-28 ENCOUNTER — HEALTH MAINTENANCE LETTER (OUTPATIENT)
Age: 46
End: 2021-03-28

## 2021-03-29 LAB — COPATH REPORT: NORMAL

## 2021-03-29 NOTE — RESULT ENCOUNTER NOTE
Please advise patient her colposcopic biopsies from her cervix showed no dysplasia nor cervical cancer.  She does not need anything else done at this time.  She only needs to follow up in 1 year for her annual exam, and at that time a Pap with HPV DNA test will be done again from her cervix to check to see if the cervix cells are normal and the HPV virus has been eradicated.  If so she will resume routine screening 3 years later.  She can let me know if she has any other questions.    Enrique Raman MD

## 2021-05-10 ENCOUNTER — PATIENT OUTREACH (OUTPATIENT)
Dept: OBGYN | Facility: CLINIC | Age: 46
End: 2021-05-10
Payer: COMMERCIAL

## 2021-05-10 DIAGNOSIS — R87.810 CERVICAL HIGH RISK HPV (HUMAN PAPILLOMAVIRUS) TEST POSITIVE: ICD-10-CM

## 2021-08-20 ENCOUNTER — TELEPHONE (OUTPATIENT)
Dept: PEDIATRICS | Facility: CLINIC | Age: 46
End: 2021-08-20

## 2021-08-20 NOTE — TELEPHONE ENCOUNTER
Forms/Letter Request    Name of form/letter: Medical accommodation     Have you been seen for this request: No     Do we have the form/letter: Yes:     When is form/letter needed by: asap    How would you like the form/letter returned:     Patient Notified form requests are processed in 3-5 business days:Yes    Okay to leave a detailed message? Yes Home number on file [phone] (home)      Star Stout on 8/20/2021 at 9:54 AM

## 2021-09-11 ENCOUNTER — HEALTH MAINTENANCE LETTER (OUTPATIENT)
Age: 46
End: 2021-09-11

## 2021-09-19 ASSESSMENT — ENCOUNTER SYMPTOMS
COUGH: 0
WEAKNESS: 0
BREAST MASS: 0
DIARRHEA: 0
HEADACHES: 0
PARESTHESIAS: 0
NAUSEA: 0
FREQUENCY: 0
SORE THROAT: 0
PALPITATIONS: 0
JOINT SWELLING: 0
MYALGIAS: 0
HEMATOCHEZIA: 0
NERVOUS/ANXIOUS: 0
EYE PAIN: 0
ABDOMINAL PAIN: 0
CHILLS: 0
SHORTNESS OF BREATH: 0
DYSURIA: 0
HEARTBURN: 0
ARTHRALGIAS: 0
FEVER: 0
DIZZINESS: 0
HEMATURIA: 0
CONSTIPATION: 0

## 2021-09-22 ENCOUNTER — OFFICE VISIT (OUTPATIENT)
Dept: PEDIATRICS | Facility: CLINIC | Age: 46
End: 2021-09-22
Payer: COMMERCIAL

## 2021-09-22 VITALS
RESPIRATION RATE: 20 BRPM | OXYGEN SATURATION: 100 % | DIASTOLIC BLOOD PRESSURE: 78 MMHG | WEIGHT: 104.7 LBS | HEIGHT: 63 IN | TEMPERATURE: 98.1 F | BODY MASS INDEX: 18.55 KG/M2 | SYSTOLIC BLOOD PRESSURE: 128 MMHG | HEART RATE: 88 BPM

## 2021-09-22 DIAGNOSIS — E03.9 HYPOTHYROIDISM, UNSPECIFIED TYPE: ICD-10-CM

## 2021-09-22 DIAGNOSIS — Z12.31 VISIT FOR SCREENING MAMMOGRAM: ICD-10-CM

## 2021-09-22 DIAGNOSIS — B00.1 RECURRENT COLD SORES: ICD-10-CM

## 2021-09-22 DIAGNOSIS — Z00.00 ROUTINE GENERAL MEDICAL EXAMINATION AT A HEALTH CARE FACILITY: Primary | ICD-10-CM

## 2021-09-22 DIAGNOSIS — D64.9 ANEMIA, UNSPECIFIED TYPE: ICD-10-CM

## 2021-09-22 DIAGNOSIS — E55.9 VITAMIN D DEFICIENCY: ICD-10-CM

## 2021-09-22 LAB
DEPRECATED CALCIDIOL+CALCIFEROL SERPL-MC: 49 UG/L (ref 20–75)
ERYTHROCYTE [DISTWIDTH] IN BLOOD BY AUTOMATED COUNT: 13.6 % (ref 10–15)
HCT VFR BLD AUTO: 36.7 % (ref 35–47)
HGB BLD-MCNC: 11.4 G/DL (ref 11.7–15.7)
MCH RBC QN AUTO: 26.7 PG (ref 26.5–33)
MCHC RBC AUTO-ENTMCNC: 31.1 G/DL (ref 31.5–36.5)
MCV RBC AUTO: 86 FL (ref 78–100)
PLATELET # BLD AUTO: 256 10E3/UL (ref 150–450)
RBC # BLD AUTO: 4.27 10E6/UL (ref 3.8–5.2)
WBC # BLD AUTO: 5.1 10E3/UL (ref 4–11)

## 2021-09-22 PROCEDURE — 84439 ASSAY OF FREE THYROXINE: CPT | Performed by: PEDIATRICS

## 2021-09-22 PROCEDURE — 82306 VITAMIN D 25 HYDROXY: CPT | Performed by: PEDIATRICS

## 2021-09-22 PROCEDURE — 36415 COLL VENOUS BLD VENIPUNCTURE: CPT | Performed by: PEDIATRICS

## 2021-09-22 PROCEDURE — 80061 LIPID PANEL: CPT | Performed by: PEDIATRICS

## 2021-09-22 PROCEDURE — 82728 ASSAY OF FERRITIN: CPT | Performed by: PEDIATRICS

## 2021-09-22 PROCEDURE — 84443 ASSAY THYROID STIM HORMONE: CPT | Performed by: PEDIATRICS

## 2021-09-22 PROCEDURE — 99396 PREV VISIT EST AGE 40-64: CPT | Performed by: PEDIATRICS

## 2021-09-22 PROCEDURE — 85027 COMPLETE CBC AUTOMATED: CPT | Performed by: PEDIATRICS

## 2021-09-22 PROCEDURE — 83550 IRON BINDING TEST: CPT | Performed by: PEDIATRICS

## 2021-09-22 PROCEDURE — 80048 BASIC METABOLIC PNL TOTAL CA: CPT | Performed by: PEDIATRICS

## 2021-09-22 RX ORDER — VALACYCLOVIR HYDROCHLORIDE 1 G/1
TABLET, FILM COATED ORAL
Qty: 4 TABLET | Refills: 6 | Status: SHIPPED | OUTPATIENT
Start: 2021-09-22 | End: 2021-10-05

## 2021-09-22 ASSESSMENT — ENCOUNTER SYMPTOMS
FEVER: 0
SHORTNESS OF BREATH: 0
EYE PAIN: 0
DIARRHEA: 0
FREQUENCY: 0
PALPITATIONS: 0
ARTHRALGIAS: 0
MYALGIAS: 0
DIZZINESS: 0
COUGH: 0
PARESTHESIAS: 0
DYSURIA: 0
BREAST MASS: 0
HEADACHES: 0
HEMATOCHEZIA: 0
ABDOMINAL PAIN: 0
CHILLS: 0
CONSTIPATION: 0
HEMATURIA: 0
WEAKNESS: 0
NERVOUS/ANXIOUS: 0
JOINT SWELLING: 0
NAUSEA: 0
HEARTBURN: 0
SORE THROAT: 0

## 2021-09-22 ASSESSMENT — MIFFLIN-ST. JEOR: SCORE: 1074.05

## 2021-09-22 NOTE — PROGRESS NOTES
SUBJECTIVE:   CC: Jayleen Mccabe is an 46 year old woman who presents for preventive health visit.       Patient has been advised of split billing requirements and indicates understanding: Yes  Healthy Habits:     Getting at least 3 servings of Calcium per day:  Yes    Bi-annual eye exam:  NO    Dental care twice a year:  Yes    Sleep apnea or symptoms of sleep apnea:  None    Diet:  Gluten-free/reduced    Frequency of exercise:  4-5 days/week    Duration of exercise:  30-45 minutes    Taking medications regularly:  No    Medication side effects:  Not applicable    PHQ-2 Total Score: 0    Additional concerns today:  Yes      Pt would like to revisit Vit D level conversation.    Has questions re: covid19 vaccination in relation to job.      Today's PHQ-2 Score:   PHQ-2 ( 1999 Pfizer) 9/19/2021   Q1: Little interest or pleasure in doing things 0   Q2: Feeling down, depressed or hopeless 0   PHQ-2 Score 0   Q1: Little interest or pleasure in doing things Not at all   Q2: Feeling down, depressed or hopeless Not at all   PHQ-2 Score 0       Abuse: Current or Past (Physical, Sexual or Emotional) - No  Do you feel safe in your environment? Yes      Social History     Tobacco Use     Smoking status: Never Smoker     Smokeless tobacco: Never Used   Substance Use Topics     Alcohol use: No     If you drink alcohol do you typically have >3 drinks per day or >7 drinks per week? No    Alcohol Use 9/22/2021   Prescreen: >3 drinks/day or >7 drinks/week? -   Prescreen: >3 drinks/day or >7 drinks/week? No       Reviewed orders with patient.  Reviewed health maintenance and updated orders accordingly - Yes  Lab work is in process  Labs reviewed in EPIC    Breast Cancer Screening:    Breast CA Risk Assessment (FHS-7) 9/19/2021   Do you have a family history of breast, colon, or ovarian cancer? No / Unknown         Mammogram Screening: Recommended annual mammography  Pertinent mammograms are reviewed under the imaging tab.    History  of abnormal Pap smear: YES - updated in Problem List and Health Maintenance accordingly  PAP / HPV Latest Ref Rng & Units 11/23/2020 6/4/2019 5/21/2018   PAP (Historical) - NIL ATYP(A) NIL   HPV16 NEG:Negative Positive(A) Negative Positive(A)   HPV18 NEG:Negative Negative Negative Negative   HRHPV NEG:Negative Negative Negative Negative     Reviewed and updated as needed this visit by clinical staff  Tobacco  Allergies  Meds   Med Hx  Surg Hx  Fam Hx  Soc Hx        Reviewed and updated as needed this visit by Provider                  Vitamin D3 - 2000 international unit(s) daily - increased supplement recently after doing more reading about vit D    Periods every 3 weeks, 2 days are heavy and last 6 days total.  Hemoglobin down slightly on last check    Lifting weights and walking for exercise    Hypothyroidism - no new symptoms of low or high thyroid - due for lab work    Promotion this year!  Pharmacy !      Review of Systems   Constitutional: Negative for chills and fever.   HENT: Negative for congestion, ear pain, hearing loss and sore throat.    Eyes: Negative for pain and visual disturbance.   Respiratory: Negative for cough and shortness of breath.    Cardiovascular: Negative for chest pain, palpitations and peripheral edema.   Gastrointestinal: Negative for abdominal pain, constipation, diarrhea, heartburn, hematochezia and nausea.   Breasts:  Negative for tenderness, breast mass and discharge.   Genitourinary: Negative for dysuria, frequency, genital sores, hematuria, pelvic pain, urgency, vaginal bleeding and vaginal discharge.   Musculoskeletal: Negative for arthralgias, joint swelling and myalgias.   Skin: Negative for rash.   Neurological: Negative for dizziness, weakness, headaches and paresthesias.   Psychiatric/Behavioral: Negative for mood changes. The patient is not nervous/anxious.           OBJECTIVE:   /78 (BP Location: Right arm, Patient Position: Sitting, Cuff Size:  "Adult Small)   Pulse 88   Temp 98.1  F (36.7  C) (Tympanic)   Resp 20   Ht 1.6 m (5' 3\")   Wt 47.5 kg (104 lb 11.2 oz)   SpO2 100%   BMI 18.55 kg/m    Physical Exam  GENERAL: healthy, alert and no distress  EYES: Eyes grossly normal to inspection, PERRL and conjunctivae and sclerae normal  HENT: ear canals and TM's normal, nose and mouth without ulcers or lesions  NECK: no adenopathy, no asymmetry, masses, or scars and thyroid normal to palpation  RESP: lungs clear to auscultation - no rales, rhonchi or wheezes  BREAST: normal without masses, tenderness or nipple discharge and no palpable axillary masses or adenopathy  CV: regular rate and rhythm, normal S1 S2, no S3 or S4, no murmur, click or rub, no peripheral edema and peripheral pulses strong  ABDOMEN: soft, nontender, no hepatosplenomegaly, no masses and bowel sounds normal  MS: no gross musculoskeletal defects noted, no edema  SKIN: no suspicious lesions or rashes  NEURO: Normal strength and tone, mentation intact and speech normal  PSYCH: mentation appears normal, affect normal/bright    Diagnostic Test Results:  pending    ASSESSMENT/PLAN:       ICD-10-CM    1. Routine general medical examination at a health care facility  Z00.00 MA Screen Bilateral w/Faustino     TSH with free T4 reflex     CBC with platelets     Iron and iron binding capacity     Ferritin     Basic metabolic panel  (Ca, Cl, CO2, Creat, Gluc, K, Na, BUN)     Lipid panel reflex to direct LDL Fasting   2. Recurrent cold sores  B00.1 valACYclovir (VALTREX) 1000 mg tablet  Rare cold sores, respond well to valtrex when needed     3. Hypothyroidism, unspecified type  E03.9 TSH with free T4 reflex  Repeat labs today, adjust thyroid replacement as needed     4. Anemia, unspecified type  D64.9 CBC with platelets     Iron and iron binding capacity     Ferritin    Heavy periods - directed to follow up with ob/gyn to discuss ablation, prefers to avoid hormone containing treatments   5. Vitamin D " "deficiency  E55.9 Vitamin D Deficiency   6. Visit for screening mammogram  Z12.31 MA Screen Bilateral w/Faustino         COUNSELING:  Special attention given to:        Regular exercise       Healthy diet/nutrition       Vision screening       Immunizations    Declined: Influenza and COVID due to Concerns about side effects/safety, Sabianism reasons        Estimated body mass index is 18.55 kg/m  as calculated from the following:    Height as of this encounter: 1.6 m (5' 3\").    Weight as of this encounter: 47.5 kg (104 lb 11.2 oz).        She reports that she has never smoked. She has never used smokeless tobacco.      Counseling Resources:  ATP IV Guidelines  Pooled Cohorts Equation Calculator  Breast Cancer Risk Calculator  BRCA-Related Cancer Risk Assessment: FHS-7 Tool  FRAX Risk Assessment  ICSI Preventive Guidelines  Dietary Guidelines for Americans, 2010  USDA's MyPlate  ASA Prophylaxis  Lung CA Screening    Ana Ryan MD  [facility] [place]  "

## 2021-09-22 NOTE — PATIENT INSTRUCTIONS
Think about uterine ablation - keep researching - if interested in hearing more, come in to see one of our Ob/Gyns    Labs today    I'll refill your levothyroxine once I see your labs            Preventive Health Recommendations  Female Ages 40 to 49    Yearly exam:     See your health care provider every year in order to  1. Review health changes.   2. Discuss preventive care.    3. Review your medicines if your doctor prescribed any.      Get a Pap test every three years (unless you have an abnormal result and your provider advises testing more often).      If you get Pap tests with HPV test, you only need to test every 5 years, unless you have an abnormal result. You do not need a Pap test if your uterus was removed (hysterectomy) and you have not had cancer.      You should be tested each year for STDs (sexually transmitted diseases), if you're at risk.     Ask your doctor if you should have a mammogram.      Have a colonoscopy (test for colon cancer) if someone in your family has had colon cancer or polyps before age 50.       Have a cholesterol test every 5 years.       Have a diabetes test (fasting glucose) after age 45. If you are at risk for diabetes, you should have this test every 3 years.    Shots: Get a flu shot each year. Get a tetanus shot every 10 years.     Nutrition:     Eat at least 5 servings of fruits and vegetables each day.    Eat whole-grain bread, whole-wheat pasta and brown rice instead of white grains and rice.    Get adequate Calcium and Vitamin D.      Lifestyle    Exercise at least 150 minutes a week (an average of 30 minutes a day, 5 days a week). This will help you control your weight and prevent disease.    Limit alcohol to one drink per day.    No smoking.     Wear sunscreen to prevent skin cancer.    See your dentist every six months for an exam and cleaning.

## 2021-09-23 LAB
ANION GAP SERPL CALCULATED.3IONS-SCNC: 5 MMOL/L (ref 3–14)
BUN SERPL-MCNC: 11 MG/DL (ref 7–30)
CALCIUM SERPL-MCNC: 9.2 MG/DL (ref 8.5–10.1)
CHLORIDE BLD-SCNC: 105 MMOL/L (ref 94–109)
CHOLEST SERPL-MCNC: 218 MG/DL
CO2 SERPL-SCNC: 27 MMOL/L (ref 20–32)
CREAT SERPL-MCNC: 0.66 MG/DL (ref 0.52–1.04)
FASTING STATUS PATIENT QL REPORTED: YES
FERRITIN SERPL-MCNC: 8 NG/ML (ref 8–252)
GFR SERPL CREATININE-BSD FRML MDRD: >90 ML/MIN/1.73M2
GLUCOSE BLD-MCNC: 82 MG/DL (ref 70–99)
HDLC SERPL-MCNC: 93 MG/DL
IRON SATN MFR SERPL: 17 % (ref 15–46)
IRON SERPL-MCNC: 67 UG/DL (ref 35–180)
LDLC SERPL CALC-MCNC: 117 MG/DL
NONHDLC SERPL-MCNC: 125 MG/DL
POTASSIUM BLD-SCNC: 4 MMOL/L (ref 3.4–5.3)
SODIUM SERPL-SCNC: 137 MMOL/L (ref 133–144)
T4 FREE SERPL-MCNC: 1.25 NG/DL (ref 0.76–1.46)
TIBC SERPL-MCNC: 396 UG/DL (ref 240–430)
TRIGL SERPL-MCNC: 41 MG/DL
TSH SERPL DL<=0.005 MIU/L-ACNC: 4.96 MU/L (ref 0.4–4)

## 2021-09-26 DIAGNOSIS — E03.9 HYPOTHYROIDISM, UNSPECIFIED TYPE: ICD-10-CM

## 2021-09-26 RX ORDER — LEVOTHYROXINE SODIUM 150 UG/1
TABLET ORAL
Qty: 90 TABLET | Refills: 3 | Status: SHIPPED | OUTPATIENT
Start: 2021-09-26 | End: 2022-10-19

## 2021-09-29 ENCOUNTER — MYC MEDICAL ADVICE (OUTPATIENT)
Dept: PEDIATRICS | Facility: CLINIC | Age: 46
End: 2021-09-29

## 2021-10-04 DIAGNOSIS — B00.1 RECURRENT COLD SORES: ICD-10-CM

## 2021-10-05 RX ORDER — VALACYCLOVIR HYDROCHLORIDE 1 G/1
TABLET, FILM COATED ORAL
Qty: 4 TABLET | Refills: 6 | Status: SHIPPED | OUTPATIENT
Start: 2021-10-05 | End: 2023-02-16

## 2021-12-07 ENCOUNTER — OFFICE VISIT (OUTPATIENT)
Dept: OPTOMETRY | Facility: CLINIC | Age: 46
End: 2021-12-07
Payer: COMMERCIAL

## 2021-12-07 DIAGNOSIS — H52.203 MYOPIA OF BOTH EYES WITH ASTIGMATISM: Primary | ICD-10-CM

## 2021-12-07 DIAGNOSIS — H52.13 MYOPIA OF BOTH EYES WITH ASTIGMATISM: Primary | ICD-10-CM

## 2021-12-07 DIAGNOSIS — H52.4 PRESBYOPIA: ICD-10-CM

## 2021-12-07 PROCEDURE — 92015 DETERMINE REFRACTIVE STATE: CPT | Performed by: OPTOMETRIST

## 2021-12-07 PROCEDURE — 92014 COMPRE OPH EXAM EST PT 1/>: CPT | Performed by: OPTOMETRIST

## 2021-12-07 ASSESSMENT — CUP TO DISC RATIO
OS_RATIO: 0.35
OD_RATIO: 0.4

## 2021-12-07 ASSESSMENT — REFRACTION_MANIFEST
OS_AXIS: 091
OS_ADD: +1.25
OS_CYLINDER: +1.00
OD_AXIS: 065
OD_CYLINDER: +0.50
OS_SPHERE: -3.25
OD_ADD: +1.25
OD_SPHERE: -2.50

## 2021-12-07 ASSESSMENT — TONOMETRY
IOP_METHOD: APPLANATION
OD_IOP_MMHG: 14
OS_IOP_MMHG: 14

## 2021-12-07 ASSESSMENT — VISUAL ACUITY
OS_SC: 20/200
OD_SC: 20/20-1
CORRECTION_TYPE: GLASSES
METHOD: SNELLEN - LINEAR
OS_SC: 20/20-1
OD_CC: 20/40+2
OD_CC+: -3
OS_CC: 20/20
OD_CC: 20/20
OD_SC: 20/150
OS_CC: 20/30-2

## 2021-12-07 ASSESSMENT — EXTERNAL EXAM - LEFT EYE: OS_EXAM: NORMAL

## 2021-12-07 ASSESSMENT — REFRACTION_WEARINGRX
OS_AXIS: 095
OD_SPHERE: -2.50
OS_CYLINDER: +1.00
SPECS_TYPE: SVL
OD_CYLINDER: +0.25
OS_SPHERE: -3.25
OD_AXIS: 040

## 2021-12-07 ASSESSMENT — CONF VISUAL FIELD
METHOD: COUNTING FINGERS
OD_NORMAL: 1
OS_NORMAL: 1

## 2021-12-07 ASSESSMENT — EXTERNAL EXAM - RIGHT EYE: OD_EXAM: NORMAL

## 2021-12-07 NOTE — LETTER
12/7/2021         RE: Jayleen Mccabe  [address]  [place] [address]        Dear Colleague,    Thank you for referring your patient, Jayleen Mccabe, to the [facility] [place]. Please see a copy of my visit note below.    Chief Complaint   Patient presents with     Annual Eye Exam         Last Eye Exam: 7/31/2019  Dilated Previously: Declined dilation today     What are you currently using to see?  Glasses - wears full time       Distance Vision Acuity: Satisfied with vision    Near Vision Acuity: Not satisfied - taking glasses off to read    Eye Comfort: good - sometimes dry  Do you use eye drops? : Yes: AT's prn in winter. 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       OBJECTIVE: See Ophthalmology exam    ASSESSMENT:    ICD-10-CM    1. Myopia of both eyes with astigmatism  H52.13     H52.203    2. Presbyopia  H52.4         PLAN:   Progressive addition lens     Alexsandra Zavala OD         Again, thank you for allowing me to participate in the care of your patient.        Sincerely,        Alexsandra Zavala, OD

## 2021-12-07 NOTE — PROGRESS NOTES
Chief Complaint   Patient presents with     Annual Eye Exam         Last Eye Exam: 7/31/2019  Dilated Previously: Declined dilation today     What are you currently using to see?  Glasses - wears full time       Distance Vision Acuity: Satisfied with vision    Near Vision Acuity: Not satisfied - taking glasses off to read    Eye Comfort: good - sometimes dry  Do you use eye drops? : Yes: AT's prn in winter. 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       OBJECTIVE: See Ophthalmology exam    ASSESSMENT:    ICD-10-CM    1. Myopia of both eyes with astigmatism  H52.13     H52.203    2. Presbyopia  H52.4         PLAN:   Progressive addition lens     Alexsandra Zavala OD

## 2022-04-21 ENCOUNTER — OFFICE VISIT (OUTPATIENT)
Dept: OBGYN | Facility: CLINIC | Age: 47
End: 2022-04-21
Payer: COMMERCIAL

## 2022-04-21 VITALS — SYSTOLIC BLOOD PRESSURE: 122 MMHG | BODY MASS INDEX: 18.33 KG/M2 | DIASTOLIC BLOOD PRESSURE: 70 MMHG | WEIGHT: 103.5 LBS

## 2022-04-21 DIAGNOSIS — N94.9 ADNEXAL FULLNESS: ICD-10-CM

## 2022-04-21 DIAGNOSIS — N87.0 DYSPLASIA OF CERVIX, LOW GRADE (CIN 1): ICD-10-CM

## 2022-04-21 DIAGNOSIS — R87.810 CERVICAL HIGH RISK HPV (HUMAN PAPILLOMAVIRUS) TEST POSITIVE: Primary | ICD-10-CM

## 2022-04-21 DIAGNOSIS — N89.8 VAGINAL IRRITATION: ICD-10-CM

## 2022-04-21 LAB
CLUE CELLS: ABNORMAL
TRICHOMONAS, WET PREP: ABNORMAL
WBC'S/HIGH POWER FIELD, WET PREP: ABNORMAL
YEAST, WET PREP: ABNORMAL

## 2022-04-21 PROCEDURE — 87624 HPV HI-RISK TYP POOLED RSLT: CPT | Performed by: OBSTETRICS & GYNECOLOGY

## 2022-04-21 PROCEDURE — 99214 OFFICE O/P EST MOD 30 MIN: CPT | Performed by: OBSTETRICS & GYNECOLOGY

## 2022-04-21 PROCEDURE — 88175 CYTOPATH C/V AUTO FLUID REDO: CPT | Performed by: OBSTETRICS & GYNECOLOGY

## 2022-04-21 PROCEDURE — 87210 SMEAR WET MOUNT SALINE/INK: CPT | Performed by: OBSTETRICS & GYNECOLOGY

## 2022-04-21 NOTE — PROGRESS NOTES
SUBJECTIVE:   CC: decreased cycle regularity and PREET 1                                               Jayleen Mccabe is a 47 year old  female who presents to clinic today for follow up pap in the setting of the abnormalities listed below. She would also like to discuss irregular periods occurring every 3.5-4 weeks. Was previously very regular, can change by 3-4 days now. Periods last 5-7 days with 2 heavy days in the middle. This is her usual.   No hot flashes or night sweats, maybe some increased vaginal dryness/itching, no pain with intercourse.   Sexually active, uses condoms. Patient's last menstrual period was 2022.  3.5w prior.     Endometrial biopsy 2019 benign.   TSH 4.96 with normal T4 21, has repeat ordered by her PCP, on synthroid    Abnormal Pap history:  Hx of PREET 2   LEEP 2006  10/08 LGSIL   colp WNL   pap WNL  2/10 pap WNL   pap WNL   pap WNL  14 Pap= Normal, Neg HPV. Repeat co-test 1 yr, if NIL/Neg could go to 3 yr co-test per guidelines.  05/18/15 Pap= Normal, Neg HPV. 3 yr co-testing per ASCCP guidelines  18 NIL, +HPV 16  18 [place]- PREET 1. Plan 1 yr co-test  19 Epithelial Cell Abnormality: Glandular Cell: Atypical Endocervical Cells, Neg HPV. Plan colp and EMB.   19 [place] bx PREET 1, ECC: neg. EMB: neg. Plan: cotest in 1 yr.  20 NIL, +HPV 16. Plan [place] bef 2/23/21  3/26/21 [place]/ECC- Negative. Plan 1 yr co-test.     Problem list and histories reviewed & adjusted, as indicated.  Additional history: as documented.       levothyroxine (SYNTHROID/LEVOTHROID) 150 MCG tablet, Take 1/2 tablet daily two times weekly and 1 tablet daily the remaining days  valACYclovir (VALTREX) 1000 mg tablet, TAKE TWO TABLETS BY MOUTH TWICE A DAY    No current facility-administered medications on file prior to visit.    No Known Allergies    OBJECTIVE:   /70   Wt 46.9 kg (103 lb 8 oz)   LMP 2022   BMI 18.33 kg/m     Const: sitting in chair in  no acute distress, comfortable  Pulm: no increased work of breathing, no cough  Psych: mood stable, appropriate affect  Neuro: A+Ox3   : External genitalia normal, healthy tissue.  No obvious excoriations, lesions, or rashes. Bartholins, urethra, normal.  Pale vaginal mucosa.  SSE: Normal cervix, normal physiologic discharge.   Bimanual: No CMT, mobile midline uterus with smooth contour but slightly enlarged. + left adnexal fullness, non-tender    ASSESSMENT/PLAN:                                                    Jayleen Mccabe is a 47 year old female  here for pap smear and evaluation of slight cycle irregularity which still fits within a normal pattern. We did review affects of age on oocyte quality and cyclic ovulation. The minimal change in cycle regularity does not point to a pathologic concern, but we did review anticipated changes occurring with perimenopause. Reviewed definition of menopause and anticipated symptoms.     1. Cervical high risk HPV (human papillomavirus) test positive  - Pap imaged thin layer diagnostic with HPV (select HPV order below)    2. Vaginal irritation  - wet prep  - suspect atrophy, discussed Replens    3. Adnexal fullness  - US Pelvic Transabdominal and Transvaginal; Future     Return to clinic once US results are available to discuss results and plan if indicated.     Oxana Rios MD  Obstetrics and Gynecology   [facility]

## 2022-04-21 NOTE — NURSING NOTE
"Chief Complaint   Patient presents with     Repeat Pap Smear     Patient had colp 21- CIN1, is due for repeat.      Abnormal Uterine Bleeding     Patient reports periods are getting closer together. Sometimes her period comes every 3 weeks, sometimes its every 4 weeks. Flow is heavy the first couple days, but then becomes \"normal\".       Initial /70   Wt 46.9 kg (103 lb 8 oz)   LMP 2022   BMI 18.33 kg/m   Estimated body mass index is 18.33 kg/m  as calculated from the following:    Height as of 21: 1.6 m (5' 3\").    Weight as of this encounter: 46.9 kg (103 lb 8 oz).  BP completed using cuff size: regular    Questioned patient about current smoking habits.  Pt. has never smoked.          The following HM Due: london Brewster CMA               "

## 2022-04-21 NOTE — PATIENT INSTRUCTIONS
Understanding Menopause  Menopause marks the point where you ve gone 12 months in a row without a period. The average age for this is around 51, but it can happen at younger or older ages. During the months or years before menopause, your body goes through many changes. It may be helpful to understand these changes and what you can do about the symptoms that result.     Use a portable fan to help stay cool.    Symptoms  Perimenopause is sometimes called the menopause transition. It happens in the months or years before menopause. It may begin when you reach your mid-40s. During this time, your estrogen levels go up and down and then decrease. As a result, you may notice some of the following symptoms:  Menstrual periods that come more or less often than usual  Menstrual periods that are lighter or heavier than normal  Increased premenstrual syndrome (PMS) symptoms  Hot flashes  Night sweats  Mood swings  Vaginal dryness with possible painful sexual activity  Difficulty going to sleep or staying asleep  Decreased sexual drive and function  Urinating frequently  It is important to remember that you could become pregnant until 12 months have passed since your last menstrual period. Ask your healthcare provider about birth control choices.   Controlling symptoms  Your healthcare provider may suggest pills or an intrauterine device (IUD) that contain the hormone progesterone. This can make your periods more regular and prevent excess bleeding. If you have symptoms due to lower estrogen levels, your healthcare provider may suggest pills that contain estrogen and/or progesterone. This is called hormone therapy (HT).  There are also other prescription medicines that help control some of the bothersome symptoms, like hot flashes, mood swings, and vaginal dryness.  Other ways for you to deal with symptoms are listed below.  Hot flashes. Wear layers that you can remove. Try all-cotton clothing, sheets, and blankets. Keep a  glass of cold water by your bed.  Pain during sex. You can buy a silicone or oil-based lubricant or vaginal moisturizer in the drugstore that may help. You may also benefit from an estrogen cream for your vagina which is a prescription.  Mood swings. Talking to friends who are going through the same changes can sometimes help.          Hormone Changes During Menopause  Menopause is not a sudden change. During the months or years before menopause (perimenopause), your ovaries begin to run out of eggs. Your body makes less estrogen and progesterone. This may bring on symptoms such as hot flashes. You ve reached menopause when you have not had a period for 1 year. From that point on, you are in postmenopause.  Perimenopause  In the years leading up to menopause, your ovaries make less estrogen. You release fewer eggs and your periods become less regular.  Symptoms you may have:  Heavier or lighter periods  Longer or shorter time between periods  Hot flashes  Mood swings  Night sweats  Insomnia  Vaginal dryness  Urinary changes including incontinence and frequency  Postmenopause  After menopause, you make very little estrogen. As a result, the uterine lining does not thicken and your periods have ended.  Symptoms you may have:  No periods  Vaginal dryness  Hot flashes  Mood swings  Night sweats  Insomnia  Surgical menopause  Menopause can occur after a surgical removal of the uterus (hysterectomy) if the ovaries are also removed. Estrogen and progesterone levels decrease quickly. This may cause sudden and severe symptoms.           Perimenopause  Menopause is when you stop having periods for good. For many women, this happens around age 50. The time of change before this is called perimenopause. It may start in your late 30s or 40s. It can last for months or years. During this time, your body makes lower levels of female hormones. This causes certain changes in your body. You may already have begun to feel the effects  of these changes. By taking steps to relieve symptoms, you can still feel good.    The menstrual cycle  Hormones are chemicals that have specific jobs in the body. A menstrual cycle is caused by changes in the levels of 2 female hormones. These hormones are estrogen and progesterone. They are made by the ovaries. In a normal cycle, estrogen creates a lining in the uterus to allow for pregnancy. The ovary then releases an egg. This is called ovulation. Progesterone levels start to go up. If the egg is not fertilized, progesterone levels go down. This causes the lining in the uterus to be shed. This is the bleeding that is your period.  Changes in hormones  As a woman ages, her ovaries begin to make hormones less regularly. In some months, there may not be ovulation. Without ovulation, progesterone isn't secreted so a normal period doesn't occur. The menstrual cycle will be harder to predict. Over time, the ovaries stop working. This can cause symptoms. Some women who have had their uterus taken out (hysterectomy) but still have ovaries may still have symptoms. When estrogen levels reach their lowest point, periods will stop completely. This is menopause.  Symptoms of perimenopause  The change in hormones can cause physical symptoms. It can also cause emotional symptoms. These may include:  Periods that come more or less often  Periods that are lighter or heavier than you re used to  Hot flashes, night sweats, or trouble sleeping  Vaginal dryness, which may make sex painful  Mood swings or fatigue  Palpitations  Sleep disturbances  Urinary symptoms including frequency and incontinence.  Medications that may help  Some medications can help ease the effects of perimenopause. These include:  Low-dose birth control pills. These often contain both estrogen and progesterone. They can help regulate your periods.  Hormone therapy (HT). This replaces some of the hormones your body has stopped making.  Antidepressants. These  help balance brain chemicals that may decrease during this time. Signs of depression can include often feeling sad or hopeless. If you feel this way, be sure to talk to your health care provider.  Gabapentin. This is a medication also used to treat seizures. This controls hot flashes and night sweats in some women.  Clonidine. This medication may control hot flashes and night sweats.

## 2022-04-23 ENCOUNTER — HEALTH MAINTENANCE LETTER (OUTPATIENT)
Age: 47
End: 2022-04-23

## 2022-04-25 LAB
BKR LAB AP GYN ADEQUACY: NORMAL
BKR LAB AP GYN INTERPRETATION: NORMAL
BKR LAB AP HPV REFLEX: NORMAL
BKR LAB AP PREVIOUS ABNORMAL: NORMAL
PATH REPORT.COMMENTS IMP SPEC: NORMAL
PATH REPORT.COMMENTS IMP SPEC: NORMAL
PATH REPORT.RELEVANT HX SPEC: NORMAL

## 2022-04-27 LAB
HUMAN PAPILLOMA VIRUS 16 DNA: NEGATIVE
HUMAN PAPILLOMA VIRUS 18 DNA: NEGATIVE
HUMAN PAPILLOMA VIRUS FINAL DIAGNOSIS: NORMAL
HUMAN PAPILLOMA VIRUS OTHER HR: NEGATIVE

## 2022-04-28 ENCOUNTER — PATIENT OUTREACH (OUTPATIENT)
Dept: OBGYN | Facility: CLINIC | Age: 47
End: 2022-04-28
Payer: COMMERCIAL

## 2022-04-30 ENCOUNTER — LAB (OUTPATIENT)
Dept: LAB | Facility: CLINIC | Age: 47
End: 2022-04-30
Payer: COMMERCIAL

## 2022-04-30 DIAGNOSIS — E03.9 HYPOTHYROIDISM, UNSPECIFIED TYPE: ICD-10-CM

## 2022-04-30 PROCEDURE — 84443 ASSAY THYROID STIM HORMONE: CPT

## 2022-04-30 PROCEDURE — 36415 COLL VENOUS BLD VENIPUNCTURE: CPT

## 2022-04-30 PROCEDURE — 84439 ASSAY OF FREE THYROXINE: CPT

## 2022-05-01 LAB
T4 FREE SERPL-MCNC: 1.22 NG/DL (ref 0.76–1.46)
TSH SERPL DL<=0.005 MIU/L-ACNC: 5.12 MU/L (ref 0.4–4)

## 2022-05-06 ENCOUNTER — ANCILLARY PROCEDURE (OUTPATIENT)
Dept: ULTRASOUND IMAGING | Facility: CLINIC | Age: 47
End: 2022-05-06
Attending: OBSTETRICS & GYNECOLOGY
Payer: COMMERCIAL

## 2022-05-06 DIAGNOSIS — N94.9 ADNEXAL FULLNESS: ICD-10-CM

## 2022-05-06 PROCEDURE — 76830 TRANSVAGINAL US NON-OB: CPT | Performed by: OBSTETRICS & GYNECOLOGY

## 2022-05-06 PROCEDURE — 76856 US EXAM PELVIC COMPLETE: CPT | Performed by: OBSTETRICS & GYNECOLOGY

## 2022-05-12 DIAGNOSIS — N94.9 ADNEXAL FULLNESS: ICD-10-CM

## 2022-05-12 DIAGNOSIS — N83.209 OVARIAN CYST: Primary | ICD-10-CM

## 2022-10-16 DIAGNOSIS — E03.9 HYPOTHYROIDISM, UNSPECIFIED TYPE: ICD-10-CM

## 2022-10-18 NOTE — TELEPHONE ENCOUNTER
Routing refill request to provider for review/approval because:  Labs out of range:  TSH  Patient needs to be seen because it has been more than 1 year since last office visit.    Jerilyn POSADA RN, BSN, PHN  [facility]

## 2022-10-19 RX ORDER — LEVOTHYROXINE SODIUM 150 MCG
TABLET ORAL
Qty: 90 TABLET | Refills: 3 | Status: SHIPPED | OUTPATIENT
Start: 2022-10-19 | End: 2023-02-16

## 2022-10-30 ENCOUNTER — HEALTH MAINTENANCE LETTER (OUTPATIENT)
Age: 47
End: 2022-10-30

## 2022-12-05 ENCOUNTER — MYC MEDICAL ADVICE (OUTPATIENT)
Dept: PEDIATRICS | Facility: CLINIC | Age: 47
End: 2022-12-05

## 2022-12-07 NOTE — TELEPHONE ENCOUNTER
Contacted patient and scheduled appointment with provider tomorrow to have lump evaluated.    Appointments in Next Year    Dec 08, 2022  5:30 PM  (Arrive by 5:10 PM)  Provider Visit with Alex[facility]andra Wong NP  [facility] ) [phone]     Pt advised to call back if noticing swelling, redness, warmth, increased pain or fever before appointment tomorrow. Pt verbalized understanding and agrees with plan.    Jayleen SPRAGUE RN

## 2022-12-08 ENCOUNTER — OFFICE VISIT (OUTPATIENT)
Dept: PEDIATRICS | Facility: CLINIC | Age: 47
End: 2022-12-08
Payer: COMMERCIAL

## 2022-12-08 VITALS
DIASTOLIC BLOOD PRESSURE: 60 MMHG | TEMPERATURE: 98.4 F | WEIGHT: 102 LBS | SYSTOLIC BLOOD PRESSURE: 112 MMHG | BODY MASS INDEX: 18.07 KG/M2 | OXYGEN SATURATION: 98 % | HEART RATE: 72 BPM | RESPIRATION RATE: 14 BRPM | HEIGHT: 63 IN

## 2022-12-08 DIAGNOSIS — N63.10 PAINFUL LUMPY RIGHT BREAST: Primary | ICD-10-CM

## 2022-12-08 DIAGNOSIS — N64.4 PAINFUL LUMPY RIGHT BREAST: Primary | ICD-10-CM

## 2022-12-08 PROCEDURE — 99213 OFFICE O/P EST LOW 20 MIN: CPT | Performed by: NURSE PRACTITIONER

## 2022-12-08 ASSESSMENT — PAIN SCALES - GENERAL: PAINLEVEL: MILD PAIN (2)

## 2022-12-08 NOTE — PROGRESS NOTES
"  Assessment & Plan     Painful lumpy right breast  Proceed with imaging.  - US Breast Right Limited 1-3 Quadrants; Future  - MA Diagnostic Digital Bilateral; Future         Patient Instructions   [phone] at the breast center        Return in about 1 week (around 12/15/2022), or if symptoms worsen or fail to improve.    Alexsandra Wong NP  [facility] [place] Clemens is a 47 year old, presenting for the following health issues:  Mass  x2 weeks first noticed  Tenderness  Size of a nickel / quarter   Mass    History of Present Illness       Reason for visit:  Sore lumpnin right breast    She eats 4 or more servings of fruits and vegetables daily.She consumes 0 sweetened beverage(s) daily.She exercises with enough effort to increase her heart rate 30 to 60 minutes per day.  She exercises with enough effort to increase her heart rate 4 days per week.   She is taking medications regularly.     Last mammogram April 2017-normal  No fam hx of breast cancer  No nipple discharge  No skin changes  When period started breast was sore then developed lump    Review of Systems         Objective    /60   Pulse 72   Temp 98.4  F (36.9  C)   Resp 14   Ht 1.6 m (5' 3\")   Wt 46.3 kg (102 lb)   SpO2 98%   BMI 18.07 kg/m    Body mass index is 18.07 kg/m .  Physical Exam   GENERAL: healthy, alert and no distress  RIGHT BREAST: axillary findings: none, mass: 2 cm x 2 cm rubbery mobile tender breast lump superior to areola 11 o 'clock position, no skin changes                    "

## 2022-12-22 ENCOUNTER — HOSPITAL ENCOUNTER (OUTPATIENT)
Dept: ULTRASOUND IMAGING | Facility: CLINIC | Age: 47
Discharge: HOME | End: 2022-12-22
Attending: NURSE PRACTITIONER
Payer: COMMERCIAL

## 2022-12-22 ENCOUNTER — HOSPITAL ENCOUNTER (OUTPATIENT)
Dept: MAMMOGRAPHY | Facility: CLINIC | Age: 47
Discharge: HOME | End: 2022-12-22
Attending: NURSE PRACTITIONER
Payer: COMMERCIAL

## 2022-12-22 DIAGNOSIS — N64.4 PAINFUL LUMPY RIGHT BREAST: ICD-10-CM

## 2022-12-22 DIAGNOSIS — N63.10 PAINFUL LUMPY RIGHT BREAST: ICD-10-CM

## 2022-12-22 PROCEDURE — 77062 BREAST TOMOSYNTHESIS BI: CPT

## 2022-12-22 PROCEDURE — 76642 ULTRASOUND BREAST LIMITED: CPT | Mod: RT

## 2022-12-28 ENCOUNTER — MYC MEDICAL ADVICE (OUTPATIENT)
Dept: OPTOMETRY | Facility: CLINIC | Age: 47
End: 2022-12-28

## 2023-01-09 ENCOUNTER — MYC MEDICAL ADVICE (OUTPATIENT)
Dept: PEDIATRICS | Facility: CLINIC | Age: 48
End: 2023-01-09

## 2023-02-09 ASSESSMENT — ENCOUNTER SYMPTOMS
CHILLS: 0
HEARTBURN: 0
ARTHRALGIAS: 0
CONSTIPATION: 0
FEVER: 0
EYE PAIN: 0
SORE THROAT: 0
COUGH: 0
MYALGIAS: 0
JOINT SWELLING: 0
ABDOMINAL PAIN: 0
PALPITATIONS: 0
HEMATOCHEZIA: 0
HEMATURIA: 0
NAUSEA: 0
DYSURIA: 0
FREQUENCY: 0
WEAKNESS: 0
NERVOUS/ANXIOUS: 0
PARESTHESIAS: 0
DIZZINESS: 0
HEADACHES: 0
DIARRHEA: 0
BREAST MASS: 1
SHORTNESS OF BREATH: 0

## 2023-02-11 SDOH — HEALTH STABILITY: PHYSICAL HEALTH: ON AVERAGE, HOW MANY MINUTES DO YOU ENGAGE IN EXERCISE AT THIS LEVEL?: 20 MIN

## 2023-02-11 SDOH — ECONOMIC STABILITY: FOOD INSECURITY: WITHIN THE PAST 12 MONTHS, THE FOOD YOU BOUGHT JUST DIDN'T LAST AND YOU DIDN'T HAVE MONEY TO GET MORE.: NEVER TRUE

## 2023-02-11 SDOH — ECONOMIC STABILITY: TRANSPORTATION INSECURITY
IN THE PAST 12 MONTHS, HAS LACK OF TRANSPORTATION KEPT YOU FROM MEETINGS, WORK, OR FROM GETTING THINGS NEEDED FOR DAILY LIVING?: NO

## 2023-02-11 SDOH — ECONOMIC STABILITY: TRANSPORTATION INSECURITY
IN THE PAST 12 MONTHS, HAS THE LACK OF TRANSPORTATION KEPT YOU FROM MEDICAL APPOINTMENTS OR FROM GETTING MEDICATIONS?: NO

## 2023-02-11 SDOH — ECONOMIC STABILITY: FOOD INSECURITY: WITHIN THE PAST 12 MONTHS, YOU WORRIED THAT YOUR FOOD WOULD RUN OUT BEFORE YOU GOT MONEY TO BUY MORE.: NEVER TRUE

## 2023-02-11 SDOH — HEALTH STABILITY: PHYSICAL HEALTH: ON AVERAGE, HOW MANY DAYS PER WEEK DO YOU ENGAGE IN MODERATE TO STRENUOUS EXERCISE (LIKE A BRISK WALK)?: 5 DAYS

## 2023-02-11 SDOH — ECONOMIC STABILITY: INCOME INSECURITY: HOW HARD IS IT FOR YOU TO PAY FOR THE VERY BASICS LIKE FOOD, HOUSING, MEDICAL CARE, AND HEATING?: NOT HARD AT ALL

## 2023-02-11 SDOH — ECONOMIC STABILITY: INCOME INSECURITY: IN THE LAST 12 MONTHS, WAS THERE A TIME WHEN YOU WERE NOT ABLE TO PAY THE MORTGAGE OR RENT ON TIME?: NO

## 2023-02-11 ASSESSMENT — SOCIAL DETERMINANTS OF HEALTH (SDOH)
HOW OFTEN DO YOU ATTEND CHURCH OR RELIGIOUS SERVICES?: NEVER
IN A TYPICAL WEEK, HOW MANY TIMES DO YOU TALK ON THE PHONE WITH FAMILY, FRIENDS, OR NEIGHBORS?: ONCE A WEEK
HOW OFTEN DO YOU GET TOGETHER WITH FRIENDS OR RELATIVES?: ONCE A WEEK
DO YOU BELONG TO ANY CLUBS OR ORGANIZATIONS SUCH AS CHURCH GROUPS UNIONS, FRATERNAL OR ATHLETIC GROUPS, OR SCHOOL GROUPS?: YES

## 2023-02-11 ASSESSMENT — LIFESTYLE VARIABLES
HOW OFTEN DO YOU HAVE A DRINK CONTAINING ALCOHOL: MONTHLY OR LESS
HOW OFTEN DO YOU HAVE SIX OR MORE DRINKS ON ONE OCCASION: NEVER
AUDIT-C TOTAL SCORE: 1
HOW MANY STANDARD DRINKS CONTAINING ALCOHOL DO YOU HAVE ON A TYPICAL DAY: 1 OR 2
SKIP TO QUESTIONS 9-10: 1

## 2023-02-16 ENCOUNTER — OFFICE VISIT (OUTPATIENT)
Dept: PEDIATRICS | Facility: CLINIC | Age: 48
End: 2023-02-16
Payer: COMMERCIAL

## 2023-02-16 VITALS
DIASTOLIC BLOOD PRESSURE: 62 MMHG | HEIGHT: 63 IN | HEART RATE: 74 BPM | BODY MASS INDEX: 18.11 KG/M2 | SYSTOLIC BLOOD PRESSURE: 118 MMHG | WEIGHT: 102.2 LBS | RESPIRATION RATE: 14 BRPM | TEMPERATURE: 97.8 F | OXYGEN SATURATION: 100 %

## 2023-02-16 DIAGNOSIS — Z12.11 COLON CANCER SCREENING: ICD-10-CM

## 2023-02-16 DIAGNOSIS — Z12.4 CERVICAL CANCER SCREENING: ICD-10-CM

## 2023-02-16 DIAGNOSIS — E03.9 HYPOTHYROIDISM, UNSPECIFIED TYPE: ICD-10-CM

## 2023-02-16 DIAGNOSIS — Z12.31 VISIT FOR SCREENING MAMMOGRAM: ICD-10-CM

## 2023-02-16 DIAGNOSIS — B00.1 RECURRENT COLD SORES: ICD-10-CM

## 2023-02-16 DIAGNOSIS — Z00.00 ROUTINE GENERAL MEDICAL EXAMINATION AT A HEALTH CARE FACILITY: Primary | ICD-10-CM

## 2023-02-16 PROCEDURE — 84443 ASSAY THYROID STIM HORMONE: CPT | Performed by: PEDIATRICS

## 2023-02-16 PROCEDURE — 87624 HPV HI-RISK TYP POOLED RSLT: CPT | Performed by: PEDIATRICS

## 2023-02-16 PROCEDURE — 36415 COLL VENOUS BLD VENIPUNCTURE: CPT | Performed by: PEDIATRICS

## 2023-02-16 PROCEDURE — G0145 SCR C/V CYTO,THINLAYER,RESCR: HCPCS | Performed by: PEDIATRICS

## 2023-02-16 PROCEDURE — 99396 PREV VISIT EST AGE 40-64: CPT | Performed by: PEDIATRICS

## 2023-02-16 RX ORDER — VALACYCLOVIR HYDROCHLORIDE 1 G/1
2000 TABLET, FILM COATED ORAL 2 TIMES DAILY
Qty: 4 TABLET | Refills: 6 | Status: SHIPPED | OUTPATIENT
Start: 2023-02-16 | End: 2023-10-05

## 2023-02-16 RX ORDER — LEVOTHYROXINE SODIUM 150 MCG
TABLET ORAL
Qty: 90 TABLET | Refills: 3 | Status: SHIPPED | OUTPATIENT
Start: 2023-02-16 | End: 2023-03-28

## 2023-02-16 ASSESSMENT — ENCOUNTER SYMPTOMS
SHORTNESS OF BREATH: 0
JOINT SWELLING: 0
COUGH: 0
PALPITATIONS: 0
DYSURIA: 0
NAUSEA: 0
FREQUENCY: 0
DIZZINESS: 0
MYALGIAS: 0
HEADACHES: 0
SORE THROAT: 0
ABDOMINAL PAIN: 0
CHILLS: 0
PARESTHESIAS: 0
ARTHRALGIAS: 0
HEMATOCHEZIA: 0
NERVOUS/ANXIOUS: 0
EYE PAIN: 0
BREAST MASS: 1
FEVER: 0
WEAKNESS: 0
HEMATURIA: 0
CONSTIPATION: 0
DIARRHEA: 0
HEARTBURN: 0

## 2023-02-16 ASSESSMENT — PAIN SCALES - GENERAL: PAINLEVEL: NO PAIN (0)

## 2023-02-16 NOTE — PATIENT INSTRUCTIONS
Think about colorectal cancer screening    Mammogram due in December    Thyroid labs today    Synthroid sent to your pharmacy - think about GoodRx if needed    Pap today        Preventive Health Recommendations  Female Ages 40 to 49    Yearly exam:   See your health care provider every year in order to  Review health changes.   Discuss preventive care.    Review your medicines if your doctor prescribed any.    Get a Pap test every three years (unless you have an abnormal result and your provider advises testing more often).    If you get Pap tests with HPV test, you only need to test every 5 years, unless you have an abnormal result. You do not need a Pap test if your uterus was removed (hysterectomy) and you have not had cancer.    You should be tested each year for STDs (sexually transmitted diseases), if you're at risk.   Ask your doctor if you should have a mammogram.    Have a colonoscopy (test for colon cancer) if someone in your family has had colon cancer or polyps before age 50.     Have a cholesterol test every 5 years.     Have a diabetes test (fasting glucose) after age 45. If you are at risk for diabetes, you should have this test every 3 years.    Shots: Get a flu shot each year. Get a tetanus shot every 10 years.     Nutrition:   Eat at least 5 servings of fruits and vegetables each day.  Eat whole-grain bread, whole-wheat pasta and brown rice instead of white grains and rice.  Get adequate Calcium and Vitamin D.      Lifestyle  Exercise at least 150 minutes a week (an average of 30 minutes a day, 5 days a week). This will help you control your weight and prevent disease.  Limit alcohol to one drink per day.  No smoking.   Wear sunscreen to prevent skin cancer.  See your dentist every six months for an exam and cleaning.

## 2023-02-16 NOTE — PROGRESS NOTES
SUBJECTIVE:   CC: Jayleen is an 47 year old who presents for preventive health visit.   Patient has been advised of split billing requirements and indicates understanding: Yes     Healthy Habits:     Getting at least 3 servings of Calcium per day:  Yes    Bi-annual eye exam:  Yes    Dental care twice a year:  Yes    Sleep apnea or symptoms of sleep apnea:  None    Diet:  Regular (no restrictions)    Frequency of exercise:  4-5 days/week    Duration of exercise:  15-30 minutes    Taking medications regularly:  Yes    Medication side effects:  None    PHQ-2 Total Score: 0    Additional concerns today:  No      Today's PHQ-2 Score:   PHQ-2 ( 1999 Pfizer) 2/9/2023   Q1: Little interest or pleasure in doing things 0   Q2: Feeling down, depressed or hopeless 0   PHQ-2 Score 0   PHQ-2 Total Score (12-17 Years)- Positive if 3 or more points; Administer PHQ-A if positive -   Q1: Little interest or pleasure in doing things Not at all   Q2: Feeling down, depressed or hopeless Not at all   PHQ-2 Score 0       Have you ever done Advance Care Planning? (For example, a Health Directive, POLST, or a discussion with a medical provider or your loved ones about your wishes): No, advance care planning information given to patient to review.  Patient plans to discuss their wishes with loved ones or provider.      Social History     Tobacco Use     Smoking status: Never     Smokeless tobacco: Never   Substance Use Topics     Alcohol use: Yes         Alcohol Use 2/9/2023   Prescreen: >3 drinks/day or >7 drinks/week? No   Prescreen: >3 drinks/day or >7 drinks/week? -       Reviewed orders with patient.  Reviewed health maintenance and updated orders accordingly - Yes  Lab work is in process  BP Readings from Last 3 Encounters:   02/16/23 118/62   12/08/22 112/60   04/21/22 122/70    Wt Readings from Last 3 Encounters:   02/16/23 46.4 kg (102 lb 3.2 oz)   12/08/22 46.3 kg (102 lb)   04/21/22 46.9 kg (103 lb 8 oz)                    Breast  Cancer Screening:    Breast CA Risk Assessment (FHS-7) 9/19/2021   Do you have a family history of breast, colon, or ovarian cancer? No / Unknown         Mammogram Screening: Recommended annual mammography  Pertinent mammograms are reviewed under the imaging tab.    History of abnormal Pap smear: YES - updated in Problem List and Health Maintenance accordingly  PAP / HPV Latest Ref Rng & Units 4/21/2022 11/23/2020 6/4/2019   PAP   Negative for Intraepithelial Lesion or Malignancy (NILM) - -   PAP (Historical) - - NIL ATYP(A)   HPV16 Negative Negative Positive(A) Negative   HPV18 Negative Negative Negative Negative   HRHPV Negative Negative Negative Negative     Reviewed and updated as needed this visit by clinical staff    Allergies  Meds     Fam Hx          Reviewed and updated as needed this visit by Provider         Stoney Rainey           Recent work up for breast mass - normal diagnostic imaging    Thyroid - no new symptoms of low or high thyroid.  Historically, took a long time to get thyroid regulated and has done best on name brand Synthroid.    Review of Systems   Constitutional: Negative for chills and fever.   HENT: Negative for congestion, ear pain, hearing loss and sore throat.    Eyes: Negative for pain and visual disturbance.   Respiratory: Negative for cough and shortness of breath.    Cardiovascular: Negative for chest pain, palpitations and peripheral edema.   Gastrointestinal: Negative for abdominal pain, constipation, diarrhea, heartburn, hematochezia and nausea.   Breasts:  Positive for breast mass. Negative for tenderness and discharge.   Genitourinary: Negative for dysuria, frequency, genital sores, hematuria, pelvic pain, urgency, vaginal bleeding and vaginal discharge.   Musculoskeletal: Negative for arthralgias, joint swelling and myalgias.   Skin: Negative for rash.   Neurological: Negative for dizziness, weakness, headaches and paresthesias.   Psychiatric/Behavioral: Negative for mood  "changes. The patient is not nervous/anxious.           OBJECTIVE:   /62 (BP Location: Right arm, Patient Position: Sitting, Cuff Size: Adult Regular)   Pulse 74   Temp 97.8  F (36.6  C) (Tympanic)   Resp 14   Ht 1.594 m (5' 2.76\")   Wt 46.4 kg (102 lb 3.2 oz)   LMP 01/25/2023   SpO2 100%   BMI 18.24 kg/m    Physical Exam  GENERAL: healthy, alert and no distress  EYES: Eyes grossly normal to inspection, PERRL and conjunctivae and sclerae normal  HENT: ear canals and TM's normal, nose and mouth without ulcers or lesions  NECK: no adenopathy, no asymmetry, masses, or scars and thyroid normal to palpation  RESP: lungs clear to auscultation - no rales, rhonchi or wheezes  BREAST: normal without masses, tenderness or nipple discharge and no palpable axillary masses or adenopathy  CV: regular rate and rhythm, normal S1 S2, no S3 or S4, no murmur, click or rub, no peripheral edema and peripheral pulses strong  ABDOMEN: soft, nontender, no hepatosplenomegaly, no masses and bowel sounds normal   (female): normal female external genitalia, normal urethral meatus, vaginal mucosa pink, moist, well rugated, and normal cervix/adnexa/uterus without masses or discharge  MS: no gross musculoskeletal defects noted, no edema  SKIN: no suspicious lesions or rashes  NEURO: Normal strength and tone, mentation intact and speech normal  PSYCH: mentation appears normal, affect normal/bright    Diagnostic Test Results:  pending    ASSESSMENT/PLAN:       ICD-10-CM    1. Routine general medical examination at a health care facility  Z00.00 TSH with free T4 reflex     Pap screen with HPV - recommended age 30 - 65 years      2. Cervical cancer screening  Z12.4 Pap screen with HPV - recommended age 30 - 65 years      3. Hypothyroidism, unspecified type  E03.9 TSH with free T4 reflex     SYNTHROID 150 MCG tablet    Repeat labs, adjust dosing as needed      4. Colon cancer screening  Z12.11 Patient contemplating this and will alert " me if I can order it for her      5. Recurrent cold sores  B00.1 valACYclovir (VALTREX) 1000 mg tablet      6. Visit for screening mammogram  Z12.31 MA Screen Bilateral w/Faustino              COUNSELING:  Reviewed preventive health counseling, as reflected in patient instructions        She reports that she has never smoked. She has never used smokeless tobacco.      Ana Ryan MD  [facility]

## 2023-02-17 LAB — TSH SERPL DL<=0.005 MIU/L-ACNC: 2.37 UIU/ML (ref 0.3–4.2)

## 2023-02-21 LAB
BKR LAB AP GYN ADEQUACY: NORMAL
BKR LAB AP GYN INTERPRETATION: NORMAL
BKR LAB AP HPV REFLEX: NORMAL
BKR LAB AP PREVIOUS ABNL DX: NORMAL
BKR LAB AP PREVIOUS ABNORMAL: NORMAL
PATH REPORT.COMMENTS IMP SPEC: NORMAL
PATH REPORT.COMMENTS IMP SPEC: NORMAL
PATH REPORT.RELEVANT HX SPEC: NORMAL

## 2023-02-24 ENCOUNTER — PATIENT OUTREACH (OUTPATIENT)
Dept: PEDIATRICS | Facility: CLINIC | Age: 48
End: 2023-02-24
Payer: COMMERCIAL

## 2023-02-24 DIAGNOSIS — N87.1 MODERATE CERVICAL DYSPLASIA: ICD-10-CM

## 2023-02-24 LAB
HUMAN PAPILLOMA VIRUS 16 DNA: POSITIVE
HUMAN PAPILLOMA VIRUS 18 DNA: NEGATIVE
HUMAN PAPILLOMA VIRUS FINAL DIAGNOSIS: ABNORMAL
HUMAN PAPILLOMA VIRUS OTHER HR: NEGATIVE

## 2023-05-24 ENCOUNTER — OFFICE VISIT (OUTPATIENT)
Dept: OBGYN | Facility: CLINIC | Age: 48
End: 2023-05-24
Payer: COMMERCIAL

## 2023-05-24 VITALS — SYSTOLIC BLOOD PRESSURE: 112 MMHG | DIASTOLIC BLOOD PRESSURE: 66 MMHG | WEIGHT: 103.4 LBS | BODY MASS INDEX: 18.46 KG/M2

## 2023-05-24 DIAGNOSIS — Z01.812 PRE-PROCEDURE LAB EXAM: ICD-10-CM

## 2023-05-24 DIAGNOSIS — B97.7 HIGH RISK HPV INFECTION: Primary | ICD-10-CM

## 2023-05-24 LAB
HCG UR QL: NEGATIVE
INTERNAL QC OK POCT: NORMAL
POCT KIT EXPIRATION DATE: NORMAL
POCT KIT LOT NUMBER: NORMAL

## 2023-05-24 PROCEDURE — 57454 BX/CURETT OF CERVIX W/SCOPE: CPT | Performed by: OBSTETRICS & GYNECOLOGY

## 2023-05-24 PROCEDURE — 88305 TISSUE EXAM BY PATHOLOGIST: CPT | Performed by: PATHOLOGY

## 2023-05-24 PROCEDURE — 81025 URINE PREGNANCY TEST: CPT | Performed by: OBSTETRICS & GYNECOLOGY

## 2023-05-24 NOTE — RESULT ENCOUNTER NOTE
Results discussed directly with patient in clinic. Further details documented in the note.     Oxana Rios MD

## 2023-05-24 NOTE — PROGRESS NOTES
INDICATIONS:                                                    Jayleen Mccabe is a 48 year old female with recent abnormal cervical cancer screening history detailed below. Here today for colposcopy. Discussed indication, risks of infection and bleeding.    Is a pregnancy test required: Yes.  Was it positive or negative?  Negative  Was a consent obtained?  Yes    Hx of PREET 2 2006  LEEP 2006  10/08 LGSIL  11/08 colp WNL  4/09 pap WNL  2/10 pap WNL  4/11 pap WNL  04/13 pap WNL  05/05/14 Pap= Normal, Neg HPV. Repeat co-test 1 yr, if NIL/Neg could go to 3 yr co-test per guidelines.  05/18/15 Pap= Normal, Neg HPV. 3 yr co-testing per ASCCP guidelines  5/21/18 NIL, +HPV 16  7/23/18 [place]- PREET 1. Plan 1 yr co-test  6/4/19 Epithelial Cell Abnormality: Glandular Cell: Atypical Endocervical Cells, Neg HPV. Plan colp and EMB.   6/16/19 [place] bx PREET 1, ECC: neg. EMB: neg. Plan: cotest in 1 yr.  11/23/20 NIL, +HPV 16. Plan [place] bef 2/23/21  3/26/21 [place]/ECC- Negative. Plan 1 yr co-test.   4/21/22 NIL pap, Neg HPV. Plan cotest in 1 year  2/16/23 NIL pap, + HR HPV 16 (neg 18 & other). [place] due by 5/16/2023.    Before the procedure, it was ensured that the patient was educated regarding the nature of her findings to date, the implications of them, and what was to be done. She has been made aware of the role of HPV, the natural history of infection, ways to minimize her future risk, the effect of HPV on the cervix, and treatment options available should they be indicated. The details of the colposcopic procedure were reviewed, as well as the risks of pain, infection and bleeding. All questions were answered before proceeding, and informed consent was therefore obtained.    PROCEDURE:                                                    /66   Wt 46.9 kg (103 lb 6.4 oz)   LMP 05/16/2023   BMI 18.46 kg/m     Cervix is viewed unenhanced and appears normal. Cervix is then stained with acetic acid and viewed colposcopically.  Squamocolumnar junction is visualized in it's entirety. acetowhite lesion(s) noted at 12 o'clock . Lugol's applied with no absent uptake. Biopsy done Yes. Endocervical curretage Done     POST PROCEDURE:                                                      IMPRESSION: Patient tolerated procedure well, colposcopy adequate and HPV    PLAN : Await the results of the biopsies.  Repeat pap in 12 months unless intervention is indicated by evidence of dysplasia on pathology. Would recommend hysterectomy in the setting of moderate or severe dysplasia.  She  tolerated the procedure well. There were no complications. Patient was discharged in stable condition.    Patient advised to call the clinic if excessive bleeding, pelvic pain, or fever.     Follow-up plan based on pathology results.    Oxana Rios MD

## 2023-05-29 LAB
PATH REPORT.COMMENTS IMP SPEC: NORMAL
PATH REPORT.COMMENTS IMP SPEC: NORMAL
PATH REPORT.FINAL DX SPEC: NORMAL
PATH REPORT.GROSS SPEC: NORMAL
PATH REPORT.MICROSCOPIC SPEC OTHER STN: NORMAL
PATH REPORT.RELEVANT HX SPEC: NORMAL
PHOTO IMAGE: NORMAL

## 2023-06-07 ENCOUNTER — PATIENT OUTREACH (OUTPATIENT)
Dept: OBGYN | Facility: CLINIC | Age: 48
End: 2023-06-07
Payer: COMMERCIAL

## 2023-06-07 DIAGNOSIS — N87.1 MODERATE CERVICAL DYSPLASIA: Primary | ICD-10-CM

## 2023-06-28 ENCOUNTER — MYC MEDICAL ADVICE (OUTPATIENT)
Dept: PEDIATRICS | Facility: CLINIC | Age: 48
End: 2023-06-28
Payer: COMMERCIAL

## 2023-08-20 ENCOUNTER — MYC MEDICAL ADVICE (OUTPATIENT)
Dept: PEDIATRICS | Facility: CLINIC | Age: 48
End: 2023-08-20
Payer: COMMERCIAL

## 2023-08-20 DIAGNOSIS — E03.9 HYPOTHYROIDISM, UNSPECIFIED TYPE: Primary | ICD-10-CM

## 2023-08-21 NOTE — TELEPHONE ENCOUNTER
Patient sending [email or web address] message, asking if thyroid labs should be drawn as new brand was started of levothyroxine. Please advise if labs needed.     Ollie CAMPBELL RN 8/21/2023 at 7:52 AM

## 2023-08-24 ENCOUNTER — LAB (OUTPATIENT)
Dept: LAB | Facility: CLINIC | Age: 48
End: 2023-08-24
Payer: COMMERCIAL

## 2023-08-24 DIAGNOSIS — E03.9 HYPOTHYROIDISM, UNSPECIFIED TYPE: ICD-10-CM

## 2023-08-24 LAB — TSH SERPL DL<=0.005 MIU/L-ACNC: 1.42 UIU/ML (ref 0.3–4.2)

## 2023-08-24 PROCEDURE — 36415 COLL VENOUS BLD VENIPUNCTURE: CPT

## 2023-08-24 PROCEDURE — 84443 ASSAY THYROID STIM HORMONE: CPT

## 2023-10-05 DIAGNOSIS — B00.1 RECURRENT COLD SORES: ICD-10-CM

## 2023-10-09 RX ORDER — VALACYCLOVIR HYDROCHLORIDE 1 G/1
2000 TABLET, FILM COATED ORAL 2 TIMES DAILY
Qty: 16 TABLET | Refills: 11 | Status: SHIPPED | OUTPATIENT
Start: 2023-10-09 | End: 2024-06-06

## 2023-11-22 ENCOUNTER — PATIENT OUTREACH (OUTPATIENT)
Dept: CARE COORDINATION | Facility: CLINIC | Age: 48
End: 2023-11-22
Payer: COMMERCIAL

## 2023-12-20 ENCOUNTER — PATIENT OUTREACH (OUTPATIENT)
Dept: CARE COORDINATION | Facility: CLINIC | Age: 48
End: 2023-12-20
Payer: COMMERCIAL

## 2024-01-17 ENCOUNTER — PATIENT OUTREACH (OUTPATIENT)
Dept: CARE COORDINATION | Facility: CLINIC | Age: 49
End: 2024-01-17
Payer: COMMERCIAL

## 2024-01-31 ENCOUNTER — PATIENT OUTREACH (OUTPATIENT)
Dept: CARE COORDINATION | Facility: CLINIC | Age: 49
End: 2024-01-31
Payer: COMMERCIAL

## 2024-02-01 ENCOUNTER — MYC MEDICAL ADVICE (OUTPATIENT)
Dept: PEDIATRICS | Facility: CLINIC | Age: 49
End: 2024-02-01
Payer: COMMERCIAL

## 2024-03-31 ENCOUNTER — HEALTH MAINTENANCE LETTER (OUTPATIENT)
Age: 49
End: 2024-03-31

## 2024-06-06 ENCOUNTER — ORDERS ONLY (AUTO-RELEASED) (OUTPATIENT)
Dept: PEDIATRICS | Facility: CLINIC | Age: 49
End: 2024-06-06

## 2024-06-06 ENCOUNTER — MYC MEDICAL ADVICE (OUTPATIENT)
Dept: PEDIATRICS | Facility: CLINIC | Age: 49
End: 2024-06-06

## 2024-06-06 ENCOUNTER — OFFICE VISIT (OUTPATIENT)
Dept: PEDIATRICS | Facility: CLINIC | Age: 49
End: 2024-06-06
Payer: COMMERCIAL

## 2024-06-06 VITALS
TEMPERATURE: 97.7 F | RESPIRATION RATE: 14 BRPM | DIASTOLIC BLOOD PRESSURE: 70 MMHG | OXYGEN SATURATION: 100 % | HEIGHT: 63 IN | WEIGHT: 100.3 LBS | BODY MASS INDEX: 17.77 KG/M2 | SYSTOLIC BLOOD PRESSURE: 100 MMHG | HEART RATE: 77 BPM

## 2024-06-06 DIAGNOSIS — Z12.11 COLON CANCER SCREENING: ICD-10-CM

## 2024-06-06 DIAGNOSIS — Z12.31 VISIT FOR SCREENING MAMMOGRAM: ICD-10-CM

## 2024-06-06 DIAGNOSIS — D50.0 ANEMIA DUE TO CHRONIC BLOOD LOSS: Primary | ICD-10-CM

## 2024-06-06 DIAGNOSIS — Z00.00 ROUTINE GENERAL MEDICAL EXAMINATION AT A HEALTH CARE FACILITY: Primary | ICD-10-CM

## 2024-06-06 DIAGNOSIS — B00.1 RECURRENT COLD SORES: ICD-10-CM

## 2024-06-06 DIAGNOSIS — Z12.4 CERVICAL CANCER SCREENING: ICD-10-CM

## 2024-06-06 DIAGNOSIS — N92.4 EXCESSIVE BLEEDING IN PREMENOPAUSAL PERIOD: ICD-10-CM

## 2024-06-06 DIAGNOSIS — Z00.00 ROUTINE GENERAL MEDICAL EXAMINATION AT A HEALTH CARE FACILITY: ICD-10-CM

## 2024-06-06 DIAGNOSIS — E03.9 HYPOTHYROIDISM, UNSPECIFIED TYPE: ICD-10-CM

## 2024-06-06 LAB
ERYTHROCYTE [DISTWIDTH] IN BLOOD BY AUTOMATED COUNT: 22 % (ref 10–15)
HCT VFR BLD AUTO: 27 % (ref 35–47)
HGB BLD-MCNC: 6.8 G/DL (ref 11.7–15.7)
MCH RBC QN AUTO: 14.8 PG (ref 26.5–33)
MCHC RBC AUTO-ENTMCNC: 25.2 G/DL (ref 31.5–36.5)
MCV RBC AUTO: 59 FL (ref 78–100)
PLATELET # BLD AUTO: 362 10E3/UL (ref 150–450)
RBC # BLD AUTO: 4.58 10E6/UL (ref 3.8–5.2)
WBC # BLD AUTO: 4 10E3/UL (ref 4–11)

## 2024-06-06 PROCEDURE — 80061 LIPID PANEL: CPT | Performed by: PEDIATRICS

## 2024-06-06 PROCEDURE — 82607 VITAMIN B-12: CPT | Performed by: PEDIATRICS

## 2024-06-06 PROCEDURE — 85027 COMPLETE CBC AUTOMATED: CPT | Performed by: PEDIATRICS

## 2024-06-06 PROCEDURE — 84439 ASSAY OF FREE THYROXINE: CPT | Performed by: PEDIATRICS

## 2024-06-06 PROCEDURE — 99396 PREV VISIT EST AGE 40-64: CPT | Performed by: PEDIATRICS

## 2024-06-06 PROCEDURE — 82728 ASSAY OF FERRITIN: CPT | Performed by: PEDIATRICS

## 2024-06-06 PROCEDURE — G0145 SCR C/V CYTO,THINLAYER,RESCR: HCPCS | Performed by: PEDIATRICS

## 2024-06-06 PROCEDURE — 87624 HPV HI-RISK TYP POOLED RSLT: CPT | Performed by: PEDIATRICS

## 2024-06-06 PROCEDURE — 83550 IRON BINDING TEST: CPT | Performed by: PEDIATRICS

## 2024-06-06 PROCEDURE — 84443 ASSAY THYROID STIM HORMONE: CPT | Performed by: PEDIATRICS

## 2024-06-06 PROCEDURE — 80053 COMPREHEN METABOLIC PANEL: CPT | Performed by: PEDIATRICS

## 2024-06-06 PROCEDURE — 36415 COLL VENOUS BLD VENIPUNCTURE: CPT | Performed by: PEDIATRICS

## 2024-06-06 PROCEDURE — 83540 ASSAY OF IRON: CPT | Performed by: PEDIATRICS

## 2024-06-06 RX ORDER — LEVOTHYROXINE SODIUM 150 MCG
TABLET ORAL
Qty: 90 TABLET | Refills: 3 | Status: SHIPPED | OUTPATIENT
Start: 2024-06-06 | End: 2024-07-15

## 2024-06-06 RX ORDER — VALACYCLOVIR HYDROCHLORIDE 1 G/1
2000 TABLET, FILM COATED ORAL 2 TIMES DAILY
Qty: 16 TABLET | Refills: 11 | Status: SHIPPED | OUTPATIENT
Start: 2024-06-06

## 2024-06-06 SDOH — HEALTH STABILITY: PHYSICAL HEALTH: ON AVERAGE, HOW MANY DAYS PER WEEK DO YOU ENGAGE IN MODERATE TO STRENUOUS EXERCISE (LIKE A BRISK WALK)?: 4 DAYS

## 2024-06-06 SDOH — HEALTH STABILITY: PHYSICAL HEALTH: ON AVERAGE, HOW MANY MINUTES DO YOU ENGAGE IN EXERCISE AT THIS LEVEL?: 20 MIN

## 2024-06-06 ASSESSMENT — SOCIAL DETERMINANTS OF HEALTH (SDOH): HOW OFTEN DO YOU GET TOGETHER WITH FRIENDS OR RELATIVES?: ONCE A WEEK

## 2024-06-06 ASSESSMENT — PAIN SCALES - GENERAL: PAINLEVEL: NO PAIN (0)

## 2024-06-06 NOTE — PROGRESS NOTES
Preventive Care Visit  [facility] TYRONE Ryan MD, Internal Medicine - Pediatrics  Jun 6, 2024      Assessment & Plan       ICD-10-CM    1. Routine general medical examination at a health care facility  Z00.00 PRIMARY CARE FOLLOW-UP SCHEDULING     MA Screen Bilateral w/Faustino     TSH     T4, free     Gynecologic Cytology (Pap) and HPV - Recommended Age 30-65 Years     COLOGUARD(EXACT SCIENCES)     Comprehensive metabolic panel (BMP + Alb, Alk Phos, ALT, AST, Total. Bili, TP)     CBC with platelets     Lipid panel reflex to direct LDL Non-fasting      2. Visit for screening mammogram  Z12.31 MA Screen Bilateral w/Faustino      3. Colon cancer screening  Z12.11 COLOGUARD(EXACT SCIENCES)      4. Cervical cancer screening  Z12.4 Gynecologic Cytology (Pap) and HPV - Recommended Age 30-65 Years      5. Hypothyroidism, unspecified type  E03.9 TSH     T4, free     SYNTHROID 150 MCG tablet    Will adjust dosing based on lab results      6. Recurrent cold sores  B00.1 valACYclovir (VALTREX) 1000 mg tablet    Uses prn              Counseling  Appropriate preventive services were discussed with this patient, including applicable screening as appropriate for fall prevention, nutrition, physical activity, Tobacco-use cessation, weight loss and cognition.  Checklist reviewing preventive services available has been given to the patient.  Reviewed patient's diet, addressing concerns and/or questions.   She is at risk for psychosocial distress and has been provided with information to reduce risk.       See Patient Instructions    Niyah Clemens is a 49 year old, presenting for the following:  Physical        6/6/2024    10:26 AM   Additional Questions   Roomed by Sixto PERKINS   Accompanied by Self         6/6/2024    10:26 AM   Patient Reported Additional Medications   Patient reports taking the following new medications No        Via the Health Maintenance questionnaire, the patient has reported the  following services have been completed -Mammogram: [facility] 2022-12-12, this information has been sent to the abstraction team.  Health Care Directive  Patient does not have a Health Care Directive or Living Will: Discussed advance care planning with patient; information given to patient to review.    HPI        6/6/2024   General Health   How would you rate your overall physical health? Excellent   Feel stress (tense, anxious, or unable to sleep) Only a little   (!) STRESS CONCERN      6/6/2024   Nutrition   Three or more servings of calcium each day? Yes   Diet: Regular (no restrictions)   How many servings of fruit and vegetables per day? 4 or more   How many sweetened beverages each day? 0-1         6/6/2024   Exercise   Days per week of moderate/strenous exercise 4 days   Average minutes spent exercising at this level 20 min         6/6/2024   Social Factors   Frequency of gathering with friends or relatives Once a week   Worry food won't last until get money to buy more No   Food not last or not have enough money for food? No   Do you have housing?  Yes   Are you worried about losing your housing? No   Lack of transportation? No   Unable to get utilities (heat,electricity)? No         6/6/2024   Fall Risk   Fallen 2 or more times in the past year? No   Trouble with walking or balance? No          6/6/2024   Dental   Dentist two times every year? Yes         6/6/2024   TB Screening   Were you born outside of the [place]? No         Today's PHQ-2 Score:       6/6/2024     7:23 AM   PHQ-2 ( 1999 Pfizer)   Q1: Little interest or pleasure in doing things 0   Q2: Feeling down, depressed or hopeless 0   PHQ-2 Score 0   Q1: Little interest or pleasure in doing things Not at all   Q2: Feeling down, depressed or hopeless Not at all   PHQ-2 Score 0           6/6/2024   Substance Use   Alcohol more than 3/day or more than 7/wk No   Do you use any other substances recreationally? No     Social History      Tobacco Use    Smoking status: Never    Smokeless tobacco: Never   Substance Use Topics    Alcohol use: Yes    Drug use: No             6/6/2024   Breast Cancer Screening   Family history of breast, colon, or ovarian cancer? No / Unknown         12/22/2022   LAST FHS-7 RESULTS   1st degree relative breast or ovarian cancer No   Any relative bilateral breast cancer No   Any male have breast cancer No   Any ONE woman have BOTH breast AND ovarian cancer No   Any woman with breast cancer before 50yrs No   2 or more relatives with breast AND/OR ovarian cancer No   2 or more relatives with breast AND/OR bowel cancer No        Mammogram Screening - Mammogram every 1-2 years updated in Health Maintenance based on mutual decision making        6/6/2024   STI Screening   New sexual partner(s) since last STI/HIV test? No     History of abnormal Pap smear: YES - reflected in Problem List and Health Maintenance accordingly        Latest Ref Rng & Units 2/16/2023     4:03 PM 4/21/2022     9:24 AM 11/23/2020     7:45 PM   PAP / HPV   PAP  Negative for Intraepithelial Lesion or Malignancy (NILM)  Negative for Intraepithelial Lesion or Malignancy (NILM)     HPV 16 DNA Negative Positive  Negative  Positive    HPV 18 DNA Negative Negative  Negative  Negative    Other HR HPV Negative Negative  Negative  Negative      ASCVD Risk   The 10-year ASCVD risk score (Ruthann CARNES, et al., 2019) is: 0.5%    Values used to calculate the score:      Age: 49 years      Sex: Female      Is Non- : No      Diabetic: No      Tobacco smoker: No      Systolic Blood Pressure: 100 mmHg      Is BP treated: No      HDL Cholesterol: 93 mg/dL      Total Cholesterol: 218 mg/dL          Reviewed and updated as needed this visit by Provider                     ROS: 10 point ROS neg other than the symptoms noted above in the HPI.       Objective    Exam  /70 (BP Location: Right arm, Patient Position: Sitting, Cuff Size:  "Adult Regular)   Pulse 77   Temp 97.7  F (36.5  C) (Tympanic)   Resp 14   Ht 1.598 m (5' 2.91\")   Wt 45.5 kg (100 lb 4.8 oz)   LMP 05/29/2024 (Approximate)   SpO2 100%   BMI 17.82 kg/m     Estimated body mass index is 17.82 kg/m  as calculated from the following:    Height as of this encounter: 1.598 m (5' 2.91\").    Weight as of this encounter: 45.5 kg (100 lb 4.8 oz).    Physical Exam  GENERAL: alert and no distress  EYES: Eyes grossly normal to inspection, PERRL and conjunctivae and sclerae normal  HENT: ear canals and TM's normal, nose and mouth without ulcers or lesions  NECK: no adenopathy, no asymmetry, masses, or scars  RESP: lungs clear to auscultation - no rales, rhonchi or wheezes  BREAST: normal without masses, tenderness or nipple discharge and no palpable axillary masses or adenopathy  CV: regular rate and rhythm, normal S1 S2, no S3 or S4, no murmur, click or rub, no peripheral edema  ABDOMEN: soft, nontender, no hepatosplenomegaly, no masses and bowel sounds normal   (female) w/bimanual: normal female external genitalia, normal urethral meatus, normal vaginal mucosa, and normal cervix/adnexa/uterus without masses or discharge  MS: no gross musculoskeletal defects noted, no edema  SKIN: no suspicious lesions or rashes  NEURO: Normal strength and tone, mentation intact and speech normal  PSYCH: mentation appears normal, affect normal/bright        Signed Electronically by: Ana Ryan MD    " WDL

## 2024-06-07 LAB
ALBUMIN SERPL BCG-MCNC: 4.5 G/DL (ref 3.5–5.2)
ALP SERPL-CCNC: 38 U/L (ref 40–150)
ALT SERPL W P-5'-P-CCNC: 19 U/L (ref 0–50)
ANION GAP SERPL CALCULATED.3IONS-SCNC: 10 MMOL/L (ref 7–15)
AST SERPL W P-5'-P-CCNC: 20 U/L (ref 0–45)
BILIRUB SERPL-MCNC: 0.2 MG/DL
BUN SERPL-MCNC: 14.7 MG/DL (ref 6–20)
CALCIUM SERPL-MCNC: 9 MG/DL (ref 8.6–10)
CHLORIDE SERPL-SCNC: 104 MMOL/L (ref 98–107)
CHOLEST SERPL-MCNC: 147 MG/DL
CREAT SERPL-MCNC: 0.64 MG/DL (ref 0.51–0.95)
DEPRECATED HCO3 PLAS-SCNC: 25 MMOL/L (ref 22–29)
EGFRCR SERPLBLD CKD-EPI 2021: >90 ML/MIN/1.73M2
FASTING STATUS PATIENT QL REPORTED: NO
FASTING STATUS PATIENT QL REPORTED: NO
FERRITIN SERPL-MCNC: 3 NG/ML (ref 11–328)
GLUCOSE SERPL-MCNC: 75 MG/DL (ref 70–99)
HDLC SERPL-MCNC: 68 MG/DL
IRON BINDING CAPACITY (ROCHE): 404 UG/DL (ref 240–430)
IRON SATN MFR SERPL: 2 % (ref 15–46)
IRON SERPL-MCNC: 8 UG/DL (ref 37–145)
LDLC SERPL CALC-MCNC: 71 MG/DL
NONHDLC SERPL-MCNC: 79 MG/DL
POTASSIUM SERPL-SCNC: 3.9 MMOL/L (ref 3.4–5.3)
PROT SERPL-MCNC: 7.2 G/DL (ref 6.4–8.3)
SODIUM SERPL-SCNC: 139 MMOL/L (ref 135–145)
T4 FREE SERPL-MCNC: 1.6 NG/DL (ref 0.9–1.7)
TRIGL SERPL-MCNC: 39 MG/DL
TSH SERPL DL<=0.005 MIU/L-ACNC: 2.07 UIU/ML (ref 0.3–4.2)
VIT B12 SERPL-MCNC: 932 PG/ML (ref 232–1245)

## 2024-06-08 LAB
HPV HR 12 DNA CVX QL NAA+PROBE: NEGATIVE
HPV16 DNA CVX QL NAA+PROBE: NEGATIVE
HPV18 DNA CVX QL NAA+PROBE: NEGATIVE
HUMAN PAPILLOMA VIRUS FINAL DIAGNOSIS: NORMAL

## 2024-06-12 ENCOUNTER — PATIENT OUTREACH (OUTPATIENT)
Dept: PEDIATRICS | Facility: CLINIC | Age: 49
End: 2024-06-12
Payer: COMMERCIAL

## 2024-06-12 LAB
BKR LAB AP GYN ADEQUACY: NORMAL
BKR LAB AP GYN INTERPRETATION: NORMAL
BKR LAB AP PREVIOUS ABNL DX: NORMAL
BKR LAB AP PREVIOUS ABNORMAL: NORMAL
PATH REPORT.COMMENTS IMP SPEC: NORMAL
PATH REPORT.COMMENTS IMP SPEC: NORMAL
PATH REPORT.RELEVANT HX SPEC: NORMAL

## 2024-06-19 ENCOUNTER — PATIENT OUTREACH (OUTPATIENT)
Dept: CARE COORDINATION | Facility: CLINIC | Age: 49
End: 2024-06-19
Payer: COMMERCIAL

## 2024-07-05 LAB — NONINV COLON CA DNA+OCC BLD SCRN STL QL: NEGATIVE

## 2024-07-09 ENCOUNTER — ANCILLARY PROCEDURE (OUTPATIENT)
Dept: ULTRASOUND IMAGING | Facility: CLINIC | Age: 49
End: 2024-07-09
Attending: PEDIATRICS
Payer: COMMERCIAL

## 2024-07-09 PROCEDURE — 76830 TRANSVAGINAL US NON-OB: CPT | Performed by: OBSTETRICS & GYNECOLOGY

## 2024-07-09 PROCEDURE — 76856 US EXAM PELVIC COMPLETE: CPT | Performed by: OBSTETRICS & GYNECOLOGY

## 2024-07-23 ENCOUNTER — LAB (OUTPATIENT)
Dept: LAB | Facility: CLINIC | Age: 49
End: 2024-07-23
Payer: COMMERCIAL

## 2024-07-23 DIAGNOSIS — N92.4 EXCESSIVE BLEEDING IN PREMENOPAUSAL PERIOD: ICD-10-CM

## 2024-07-23 DIAGNOSIS — D50.0 ANEMIA DUE TO CHRONIC BLOOD LOSS: ICD-10-CM

## 2024-07-23 PROCEDURE — 83540 ASSAY OF IRON: CPT

## 2024-07-23 PROCEDURE — 82728 ASSAY OF FERRITIN: CPT

## 2024-07-23 PROCEDURE — 83550 IRON BINDING TEST: CPT

## 2024-07-23 PROCEDURE — 36415 COLL VENOUS BLD VENIPUNCTURE: CPT

## 2024-07-23 PROCEDURE — 86364 TISS TRNSGLTMNASE EA IG CLAS: CPT

## 2024-07-23 PROCEDURE — 82784 ASSAY IGA/IGD/IGG/IGM EACH: CPT

## 2024-07-23 PROCEDURE — 85027 COMPLETE CBC AUTOMATED: CPT

## 2024-07-24 LAB
BURR CELLS BLD QL SMEAR: SLIGHT
ELLIPTOCYTES BLD QL SMEAR: SLIGHT
ERYTHROCYTE [DISTWIDTH] IN BLOOD BY AUTOMATED COUNT: ABNORMAL %
FERRITIN SERPL-MCNC: 47 NG/ML (ref 11–328)
HCT VFR BLD AUTO: 36.9 % (ref 35–47)
HGB BLD-MCNC: 10.2 G/DL (ref 11.7–15.7)
IRON BINDING CAPACITY (ROCHE): 372 UG/DL (ref 240–430)
IRON SATN MFR SERPL: 11 % (ref 15–46)
IRON SERPL-MCNC: 40 UG/DL (ref 37–145)
MCH RBC QN AUTO: 19.9 PG (ref 26.5–33)
MCHC RBC AUTO-ENTMCNC: 27.6 G/DL (ref 31.5–36.5)
MCV RBC AUTO: 72 FL (ref 78–100)
PLAT MORPH BLD: ABNORMAL
PLATELET # BLD AUTO: 234 10E3/UL (ref 150–450)
RBC # BLD AUTO: 5.12 10E6/UL (ref 3.8–5.2)
RBC MORPH BLD: ABNORMAL
WBC # BLD AUTO: 5.3 10E3/UL (ref 4–11)

## 2024-07-25 LAB — IGA SERPL-MCNC: 106 MG/DL (ref 84–499)

## 2024-07-26 LAB
TTG IGA SER-ACNC: 64 U/ML
TTG IGG SER-ACNC: <0.6 U/ML

## 2024-07-27 DIAGNOSIS — D50.0 IRON DEFICIENCY ANEMIA DUE TO CHRONIC BLOOD LOSS: ICD-10-CM

## 2024-07-27 DIAGNOSIS — K90.0 CELIAC SPRUE: Primary | ICD-10-CM

## 2024-08-06 ENCOUNTER — OFFICE VISIT (OUTPATIENT)
Dept: OPTOMETRY | Facility: CLINIC | Age: 49
End: 2024-08-06
Payer: COMMERCIAL

## 2024-08-06 DIAGNOSIS — H52.203 MYOPIA OF BOTH EYES WITH ASTIGMATISM: Primary | ICD-10-CM

## 2024-08-06 DIAGNOSIS — H52.13 MYOPIA OF BOTH EYES WITH ASTIGMATISM: Primary | ICD-10-CM

## 2024-08-06 DIAGNOSIS — H52.4 PRESBYOPIA: ICD-10-CM

## 2024-08-06 PROCEDURE — 92014 COMPRE OPH EXAM EST PT 1/>: CPT | Performed by: OPTOMETRIST

## 2024-08-06 PROCEDURE — 92015 DETERMINE REFRACTIVE STATE: CPT | Performed by: OPTOMETRIST

## 2024-08-06 ASSESSMENT — REFRACTION_MANIFEST
OS_SPHERE: -2.75
OS_SPHERE: -3.50
METHOD_AUTOREFRACTION: 1
OS_CYLINDER: +1.00
OD_SPHERE: -2.75
OD_CYLINDER: +0.75
OS_ADD: +2.00
OD_SPHERE: -2.75
OD_CYLINDER: +0.75
OS_AXIS: 081
OD_AXIS: 051
OS_CYLINDER: +0.25
OD_ADD: +2.00
OS_AXIS: 095
OD_AXIS: 052

## 2024-08-06 ASSESSMENT — REFRACTION_WEARINGRX
OD_CYLINDER: +0.50
OS_AXIS: 091
OS_ADD: +1.25
SPECS_TYPE: PAL
OD_AXIS: 065
OS_CYLINDER: +1.00
OD_SPHERE: -2.50
OS_SPHERE: -3.25
OD_ADD: +1.25

## 2024-08-06 ASSESSMENT — VISUAL ACUITY
OS_CC: 20/20
OD_CC: 20/20
OS_CC: 20/40
OD_CC: 20/30
CORRECTION_TYPE: GLASSES
METHOD: SNELLEN - LINEAR
OD_CC+: -2

## 2024-08-06 ASSESSMENT — TONOMETRY
OS_IOP_MMHG: 16
OD_IOP_MMHG: 16
IOP_METHOD: APPLANATION

## 2024-08-06 ASSESSMENT — CONF VISUAL FIELD
OD_SUPERIOR_TEMPORAL_RESTRICTION: 0
OS_INFERIOR_TEMPORAL_RESTRICTION: 0
METHOD: COUNTING FINGERS
OD_INFERIOR_NASAL_RESTRICTION: 0
OD_NORMAL: 1
OS_SUPERIOR_TEMPORAL_RESTRICTION: 0
OS_INFERIOR_NASAL_RESTRICTION: 0
OD_INFERIOR_TEMPORAL_RESTRICTION: 0
OS_NORMAL: 1
OS_SUPERIOR_NASAL_RESTRICTION: 0
OD_SUPERIOR_NASAL_RESTRICTION: 0

## 2024-08-06 ASSESSMENT — CUP TO DISC RATIO
OS_RATIO: 0.3
OD_RATIO: 0.35

## 2024-08-06 ASSESSMENT — EXTERNAL EXAM - RIGHT EYE: OD_EXAM: NORMAL

## 2024-08-06 ASSESSMENT — EXTERNAL EXAM - LEFT EYE: OS_EXAM: NORMAL

## 2024-08-06 NOTE — PROGRESS NOTES
Chief Complaint   Patient presents with    Annual Eye Exam      Accompanied by daughter   Last Eye Exam: 12/2021  Dilated Previously: Yes, side effects of dilation explained today    What are you currently using to see?  glasses       Distance Vision Acuity: Satisfied with vision    Near Vision Acuity: Satisfied with vision while reading and using computer with glasses    Eye Comfort: dry  Do you use eye drops? : Yes: Refresh when needed      Alessandra Ca - Optometric Assistant           Medical, surgical and family histories reviewed and updated 8/6/2024.       OBJECTIVE: See Ophthalmology exam    ASSESSMENT:    ICD-10-CM    1. Myopia of both eyes with astigmatism  H52.13 EYE EXAM (SIMPLE-NONBILLABLE)    H52.203 REFRACTION      2. Presbyopia  H52.4 EYE EXAM (SIMPLE-NONBILLABLE)     REFRACTION          PLAN:   Update progressive addition lens     Alexsandra Zavala OD

## 2024-08-06 NOTE — LETTER
8/6/2024      Jayleen Mccabe  [address]  [place] [address]      Dear Colleague,    Thank you for referring your patient, Jayleen Mccabe, to the [facility]. Please see a copy of my visit note below.    Chief Complaint   Patient presents with     Annual Eye Exam      Accompanied by daughter   Last Eye Exam: 12/2021  Dilated Previously: Yes, side effects of dilation explained today    What are you currently using to see?  glasses       Distance Vision Acuity: Satisfied with vision    Near Vision Acuity: Satisfied with vision while reading and using computer with glasses    Eye Comfort: dry  Do you use eye drops? : Yes: Refresh when needed      Alessandra Ca - Optometric Assistant           Medical, surgical and family histories reviewed and updated 8/6/2024.       OBJECTIVE: See Ophthalmology exam    ASSESSMENT:    ICD-10-CM    1. Myopia of both eyes with astigmatism  H52.13 EYE EXAM (SIMPLE-NONBILLABLE)    H52.203 REFRACTION      2. Presbyopia  H52.4 EYE EXAM (SIMPLE-NONBILLABLE)     REFRACTION          PLAN:   Update progressive addition lens     Alexsandra Zavala OD     Again, thank you for allowing me to participate in the care of your patient.        Sincerely,        Alexsandra Zavala, OD

## 2024-09-05 ENCOUNTER — OFFICE VISIT (OUTPATIENT)
Dept: OBGYN | Facility: CLINIC | Age: 49
End: 2024-09-05
Payer: COMMERCIAL

## 2024-09-05 VITALS — DIASTOLIC BLOOD PRESSURE: 76 MMHG | BODY MASS INDEX: 18.35 KG/M2 | WEIGHT: 103.3 LBS | SYSTOLIC BLOOD PRESSURE: 122 MMHG

## 2024-09-05 DIAGNOSIS — D50.0 IRON DEFICIENCY ANEMIA DUE TO CHRONIC BLOOD LOSS: ICD-10-CM

## 2024-09-05 DIAGNOSIS — D25.1 INTRAMURAL LEIOMYOMA OF UTERUS: Primary | ICD-10-CM

## 2024-09-05 PROCEDURE — 99215 OFFICE O/P EST HI 40 MIN: CPT | Performed by: OBSTETRICS & GYNECOLOGY

## 2024-09-05 RX ORDER — LEVONORGESTREL / ETHINYL ESTRADIOL AND ETHINYL ESTRADIOL 150-30(84)
1 KIT ORAL DAILY
Qty: 91 TABLET | Refills: 3 | Status: SHIPPED | OUTPATIENT
Start: 2024-09-05

## 2024-09-05 NOTE — PATIENT INSTRUCTIONS
"You can have fewer periods every year by taking your birth control pills continuously. Your pill pack is designed to be used for 91 days continuously. If you have spotting in the middle, just keep taking your pills. However, if your spotting lasts more than 5 days in a row, or turns into heavier bleeding like a period, please stop your pack for 4-5 days and then resume where you left off.  This allows your to \"reset\" the lining of the uterus and should hopefully reduce spotting in the next cycle.        - reviewed management options for fibroid uterus including expectant management, myomectomy, hysterectomy, radiofrequency ablation, and UAE. If you develop bulk symptoms such as difficulty emptying bladder or bowels or pelvic pressure, intervention may be indicated.    "

## 2024-09-05 NOTE — PROGRESS NOTES
SUBJECTIVE:   CC: HUB, anemia, fibroid uterus                                               Jayleen Mccabe is a 49 year old  female who presents to clinic today for evaluation and treatment discussion of heavy uterine bleeding and large fibroid uterus. She is hoping to start with medical management but would like to consider all options. Denies bulk symptoms at this time. Chronic blood loss anemia, has required IV infusions but no blood transfusions.   Hx benign endometrial biopsy 2019.  Hx  x2.   Appendectomy .    Hgb 10.2 1 month ago, up from 6.8 the month prior    TVUS 24  LMP: 2024    Hormones: none     Measurements:  Uterus: 15.7 x 12.1 x 11.6 cm     Position is anteverted.  Contour is irregular w/ myomata:   1) mid 10.4 x 9.1 cm     Endo cav: 6.2 mm       Distorted  Cervix: wnl     Right ovary: 2.1 x 1.2 x 2.4 cm  Wnl  Left ovary: 3.4 x 2.3 x 2.6 cm Complex cyst 2.1 x 1.8 x 2.7 cm     Cul de sac: no free fluid     Technique: Transvaginal Imaging performed  Transabdominal Imaging performed       Problem list and histories reviewed & adjusted, as indicated.  Additional history: as documented.    ROS:  Heme: no history of blood clots  Neuro: no history of migraine or aura    Does not smoke     Patient Active Problem List   Diagnosis    Moderate cervical dysplasia    CARDIOVASCULAR SCREENING; LDL GOAL LESS THAN 160    Hypothyroidism, unspecified     Past Surgical History:   Procedure Laterality Date    APPENDECTOMY      LEEP TX, CERVICAL  2006      Social History     Tobacco Use    Smoking status: Never    Smokeless tobacco: Never   Substance Use Topics    Alcohol use: Yes      Problem (# of Occurrences) Relation (Name,Age of Onset)    Cancer (1) Paternal Grandmother: uterine or cervical cancer    Diabetes (1) Father (Wally)    Lipids (1) Father (Wally)    Obesity (1) Mother (Graciela)    Breast Cancer (1) Other (Great aunt)    C.A.D. (1) Father (Wally, 45): heart disease     Hyperlipidemia (1) Father (Wally)    Coronary Artery Disease (1) Father (Wally)           Negative family history of: Glaucoma, Macular Degeneration              Current Outpatient Medications   Medication Sig Dispense Refill    SYNTHROID 150 MCG tablet TAKE ONE-HALF TABLET BY MOUTH TWO DAYS WEEKLY AND ONE TABLET ALL OTHER DAYS 90 tablet 3    valACYclovir (VALTREX) 1000 mg tablet Take 2 tablets (2,000 mg) by mouth 2 times daily 16 tablet 11     No current facility-administered medications for this visit.     No Known Allergies    OBJECTIVE:   /76   Wt 46.9 kg (103 lb 4.8 oz)   LMP 2024   BMI 18.35 kg/m     Const: sitting in chair in no acute distress, comfortable  Eyes: no scleral icterus, EOMI  CV: regular rate, well perfused  Pulm: no increased work of breathing, no cough  Skin: warm and dry, no rashes/lesions  Psych: mood stable, appropriate affect  Abd: soft, visibly enlarged uterine fundus, 15w sized. no hepatosplenomegaly, non-tender to palpation  Neuro: A+Ox3   : External genitalia normal well-estrogenized, healthy tissue.  No obvious excoriations, lesions, or rashes. Bartholins, urethra, normal.  Normal moist pink vaginal mucosa.  SSE: Normal cervix, moderate dark blood in the vault   Bimanual: No CMT, enlarged, 15w sized uterus, good mobility side to side. Minimal descensus likely associated with bulky uterus but there is some degree of mobility. No adnexal masses or tenderness appreciated.     ASSESSMENT/PLAN:                                                    Jayleen Mccabe is a 49 year old female  here for follow up visit for consultation for fibroid uterus with interval growth of her midline fibroid.     1. Intramural leiomyoma of uterus  - reviewed management options for fibroid uterus including expectant management, myomectomy, hysterectomy, radiofrequency ablation, and UAE. If she develops bulk symptoms such as difficulty emptying bladder or bowels or pelvic pressure,  intervention may be indicated.    - she would initially like to work on management of heavy bleeding and therefore interested in trial of continuous use OCPs.  - she is considering UAE vs vNOTES hysterectomy in the future. We discussed depo Lupron to reduce overall fibroid and uterine size, she would consider. She is aware that any minimally invasive surgery may be converted to open. She is interested in consultation with IR if oral contraceptives do not resolve her symptoms. At this time, no bulk symptoms.   - levonorgest-eth estrad 91-Day (SEASONIQUE) 0.15-0.03 &0.01 MG tablet; Take 1 tablet by mouth daily.  Dispense: 91 tablet; Refill: 3    2. Iron deficiency anemia due to chronic blood loss  - levonorgest-eth estrad 91-Day (SEASONIQUE) 0.15-0.03 &0.01 MG tablet; Take 1 tablet by mouth daily.  Dispense: 91 tablet; Refill: 3  - ongoing management by Dr. Ryan    Return to clinic 3 months to evaluate bleeding.    40 minutes spent by me on the date of the encounter doing chart review, review of test results, patient visit, and documentation      Oxana Rios MD  Obstetrics and Gynecology   [facility]

## 2024-10-06 ENCOUNTER — MYC MEDICAL ADVICE (OUTPATIENT)
Dept: PEDIATRICS | Facility: CLINIC | Age: 49
End: 2024-10-06
Payer: COMMERCIAL

## 2024-10-20 ENCOUNTER — MYC MEDICAL ADVICE (OUTPATIENT)
Dept: PEDIATRICS | Facility: CLINIC | Age: 49
End: 2024-10-20
Payer: COMMERCIAL

## 2024-10-22 ENCOUNTER — TELEPHONE (OUTPATIENT)
Dept: OTHER | Facility: CLINIC | Age: 49
End: 2024-10-22
Payer: COMMERCIAL

## 2024-10-22 NOTE — TELEPHONE ENCOUNTER
Referral for UFE.  Contacted patient, discussed rationale for MRI pelvis. Need to obtain to make sure patient is technically a candidate for procedure. Once MRI is obtained will review with Interventional radiologist, if candidate will schedule consult.  MRI is needed to evaluate morphology, location and make sure the fibroid enhances.       Also discussed UFE, brief description of procedure and expectations.    Patient will follow up with Dr. Rios.  Appreciates the phone call.  Ijeoma Freedman RN  IR nurse clinician  [phone]

## 2024-11-02 ENCOUNTER — LAB (OUTPATIENT)
Dept: LAB | Facility: CLINIC | Age: 49
End: 2024-11-02
Payer: COMMERCIAL

## 2024-11-02 DIAGNOSIS — K90.0 CELIAC SPRUE: ICD-10-CM

## 2024-11-02 DIAGNOSIS — D50.0 IRON DEFICIENCY ANEMIA DUE TO CHRONIC BLOOD LOSS: ICD-10-CM

## 2024-11-02 LAB
ERYTHROCYTE [DISTWIDTH] IN BLOOD BY AUTOMATED COUNT: 14.8 % (ref 10–15)
HCT VFR BLD AUTO: 44.4 % (ref 35–47)
HGB BLD-MCNC: 13.5 G/DL (ref 11.7–15.7)
MCH RBC QN AUTO: 26.7 PG (ref 26.5–33)
MCHC RBC AUTO-ENTMCNC: 30.4 G/DL (ref 31.5–36.5)
MCV RBC AUTO: 88 FL (ref 78–100)
PLATELET # BLD AUTO: 203 10E3/UL (ref 150–450)
RBC # BLD AUTO: 5.05 10E6/UL (ref 3.8–5.2)
WBC # BLD AUTO: 7.6 10E3/UL (ref 4–11)

## 2024-11-02 PROCEDURE — 36415 COLL VENOUS BLD VENIPUNCTURE: CPT

## 2024-11-02 PROCEDURE — 83550 IRON BINDING TEST: CPT

## 2024-11-02 PROCEDURE — 83540 ASSAY OF IRON: CPT

## 2024-11-02 PROCEDURE — 85027 COMPLETE CBC AUTOMATED: CPT

## 2024-11-03 LAB
IRON BINDING CAPACITY (ROCHE): 321 UG/DL (ref 240–430)
IRON SATN MFR SERPL: 39 % (ref 15–46)
IRON SERPL-MCNC: 125 UG/DL (ref 37–145)

## 2024-12-26 ENCOUNTER — PATIENT OUTREACH (OUTPATIENT)
Dept: CARE COORDINATION | Facility: CLINIC | Age: 49
End: 2024-12-26
Payer: COMMERCIAL

## 2025-01-31 ENCOUNTER — ANCILLARY PROCEDURE (OUTPATIENT)
Dept: MRI IMAGING | Facility: CLINIC | Age: 50
End: 2025-01-31
Attending: OBSTETRICS & GYNECOLOGY
Payer: COMMERCIAL

## 2025-01-31 DIAGNOSIS — D25.1 INTRAMURAL LEIOMYOMA OF UTERUS: ICD-10-CM

## 2025-01-31 PROCEDURE — 72195 MRI PELVIS W/O DYE: CPT | Performed by: RADIOLOGY

## 2025-01-31 RX ORDER — GADOBUTROL 604.72 MG/ML
7.5 INJECTION INTRAVENOUS ONCE
Status: ACTIVE | OUTPATIENT
Start: 2025-01-31

## 2025-02-20 ENCOUNTER — CARE COORDINATION (OUTPATIENT)
Dept: OTHER | Facility: CLINIC | Age: 50
End: 2025-02-20
Payer: COMMERCIAL

## 2025-02-20 NOTE — PROGRESS NOTES
IR did not order the MRI pelvis with and without so was not aware that it had been completed.  Will reach out to patient to see if she would like a consult.  Ijeoma Freedman RN  Interventional Radiology  [phone]  Pager: [phone]

## 2025-02-24 NOTE — PROGRESS NOTES
Patient returned phone call.  Scheduled for UFE consult with Dr. Marion on 3/10 at 3:00 pm.  Patient requests female provider.  Ijeoma Freedman RN  IR nurse clinician  [phone]

## 2025-02-27 ENCOUNTER — TELEPHONE (OUTPATIENT)
Dept: OBGYN | Facility: CLINIC | Age: 50
End: 2025-02-27
Payer: COMMERCIAL

## 2025-02-27 NOTE — TELEPHONE ENCOUNTER
Oxana Rios MD  [facility] Triage  Please forward MRI results to IR and place consultation for uterine artery embolization for fibroid uterus. Please notify patient of plan.    Thanks,  Oxana Rios MD      IR notified and appt scheduled.     Sanjuanita Simpson RN  [facility]

## 2025-03-10 ENCOUNTER — OFFICE VISIT (OUTPATIENT)
Dept: OTHER | Facility: CLINIC | Age: 50
End: 2025-03-10
Attending: RADIOLOGY
Payer: COMMERCIAL

## 2025-03-10 VITALS — SYSTOLIC BLOOD PRESSURE: 128 MMHG | HEART RATE: 87 BPM | DIASTOLIC BLOOD PRESSURE: 84 MMHG

## 2025-03-10 DIAGNOSIS — D25.9 FIBROID UTERUS: Primary | ICD-10-CM

## 2025-03-10 PROCEDURE — 99213 OFFICE O/P EST LOW 20 MIN: CPT | Performed by: RADIOLOGY

## 2025-03-10 NOTE — PROGRESS NOTES
[facility]        Patient is here for a consult.    Pt is currently taking no meds that would impact our treatment plan.    /84 (BP Location: Right arm, Patient Position: Chair, Cuff Size: Adult Regular)   Pulse 87     The provider has been notified that the patient has no concerns.     Questions patient would like addressed today are: N/A.    Refills are needed: N/A    Has homecare services and agency name:  Kimberly Pinon MA

## 2025-03-11 NOTE — PROGRESS NOTES
VASCULAR OUTPATIENT CONSULT OR VISIT  PHYSICIAN:  Dr. Katey Marion      LOCATION: [facility]    Jayleen Mccabe   Medical Record #:  [id number]  YOB: 1975  Age:  49 year old     Date of Service: 3/10/2025    PRIMARY CARE PROVIDER: Ana Ryan      HPI:  Jayleen Mccabe is a 49 year old female who is being referred by Dr. Oxana Rios OB/GYN for evaluation and treatment for symptomatic uterine artery fibroid.  The patient reports abdominal fullness, urinary frequency, anemia and menorrhagia.  The patient has tried OCP therapy and has since discontinued due to the side effects.  She would like to avoid a hysterectomy if possible.  She describes her menses as 6 to 7 days, with heavy bleeding in the first few days that requires multiple changes of feminine products.    PHH:    Past Medical History:   Diagnosis Date    Abnormal Pap smear     Cervical high risk HPV (human papillomavirus) test positive 05/21/2018    +HPV 16    LBP (low back pain) 2005    Nl MRI    Papanicolaou smear of cervix with atypical squamous cells cannot exclude high grade squamous intraepithelial lesion (ASC-H)     LEEP 2006    Unspecified hypothyroidism         Past Surgical History:   Procedure Laterality Date    APPENDECTOMY  2000    LEEP TX, CERVICAL  2006       ALLERGIES:  Patient has no known allergies.    MEDS:    Current Outpatient Medications:     valACYclovir (VALTREX) 1000 mg tablet, Take 2 tablets (2,000 mg) by mouth 2 times daily (Patient taking differently: Take 2,000 mg by mouth as needed.), Disp: 16 tablet, Rfl: 11    levonorgest-eth estrad 91-Day (SEASONIQUE) 0.15-0.03 &0.01 MG tablet, Take 1 tablet by mouth daily. (Patient not taking: Reported on 3/10/2025), Disp: 91 tablet, Rfl: 3    SYNTHROID 150 MCG tablet, TAKE ONE-HALF TABLET BY MOUTH TWO DAYS WEEKLY AND ONE TABLET ALL OTHER DAYS, Disp: 90 tablet, Rfl: 3  No current facility-administered medications for this  visit.    Facility-Administered Medications Ordered in Other Visits:     gadobutrol (GADAVIST) injection 7.5 mL, 7.5 mL, Intravenous, Once, Jhonatan Grossman MD    SOCIAL HABITS:    History   Smoking Status    Never   Smokeless Tobacco    Never     Social History    Substance and Sexual Activity      Alcohol use: Yes      History   Drug Use No       FAMILY HISTORY:    Family History   Problem Relation Age of Onset    Obesity Mother     C.A.D. Father 45        heart disease    Diabetes Father     Lipids Father     Coronary Artery Disease Father     Hyperlipidemia Father     Cancer Paternal Grandmother         uterine or cervical cancer    Breast Cancer Other     Glaucoma No family hx of     Macular Degeneration No family hx of        REVIEW OF SYSTEMS:    A 12 point ROS was reviewed and except for what is listed in the HPI above, all others are negative    PE:  /84 (BP Location: Right arm, Patient Position: Chair, Cuff Size: Adult Regular)   Pulse 87   Wt Readings from Last 1 Encounters:   09/05/24 103 lb 4.8 oz (46.9 kg)     There is no height or weight on file to calculate BMI.    EXAM:  GENERAL: This is a well-developed 49 year old female who appears her stated age  EYES: Grossly normal.  MOUTH: Buccal mucosa normal   MUSCULOSKELETAL: Grossly normal and both lower extremities are intact.  HEME/LYMPH: No lymphedema  NEUROLOGIC: Focally intact, Alert and oriented x 3.   PSYCH: appropriate affect  INTEGUMENT: No open lesions or ulcers        DIAGNOSTIC STUDIES:     Images:    EXAMINATION: MR PELVIS (GYN) W/O CONTRAST, 1/31/2025 4:25 PM     COMPARISON: None     HISTORY: Intramural leiomyoma of uterus     TECHNIQUE: Multiplanar, multisequence imaging was obtained of the  pelvis without intravenous contrast. The patient declined intravenous  contrast.     FINDINGS:   Uterus: The uterus is enlarged, measuring 12 x 11.7 x 13.4 cm. There  is a large intramural fibroid measuring 7.5 x 9.6 x 8.5 cm in the  anterior  myometrium which is displacing the endometrium posteriorly.  The endometrial stripe is distorted and endometrial extension of the  fibroid is possible. Additional 2.9 cm fibroid in the posterior  myometrium. The cervix is normal except for a few nabothian cysts. No  vaginal mass.     Ovaries: Mild T2 hyperintense structure with the cystic focus in the  right lower quadrant is presumed to be the normal right ovary (series  5, image 25). Left ovary is not separately visualized (limited by  motion artifact).      Lymph nodes: No pelvic or inguinal lymphadenopathy by size criteria.     Urinary bladder and urethra: normal.     GI tract: Rectum and visualized portions of the sigmoid colon are  normal.     Bones: No osseous abnormality. Mild posterior disc bulge at L4-5 and  L5-S1 levels.                                                                      IMPRESSION:   1.  Large heterogenous intramural fibroid in the anterior myometrium  distorting the endometrial stripe and a small intramural fibroid in  the posterior myometrium on the right side.  2.  No pelvic lymphadenopathy.  3.  No hydronephrosis visualized on the limited  images of the  kidneys available.     JEREL ISABEL MD       LABS:      Sodium   Date Value Ref Range Status   06/06/2024 139 135 - 145 mmol/L Final     Comment:     Reference intervals for this test were updated on 09/26/2023 to more accurately reflect our healthy population. There may be differences in the flagging of prior results with similar values performed with this method. Interpretation of those prior results can be made in the context of the updated reference intervals.    09/22/2021 137 133 - 144 mmol/L Final   07/12/2018 138 133 - 144 mmol/L Final   04/18/2016 138 133 - 144 mmol/L Final   05/05/2014 140 133 - 144 mmol/L Final     Urea Nitrogen   Date Value Ref Range Status   06/06/2024 14.7 6.0 - 20.0 mg/dL Final   09/22/2021 11 7 - 30 mg/dL Final   07/12/2018 12 7 - 30 mg/dL  Final   04/18/2016 12 7 - 30 mg/dL Final   05/05/2014 17 5 - 24 mg/dL Final     Hemoglobin   Date Value Ref Range Status   11/02/2024 13.5 11.7 - 15.7 g/dL Final   07/23/2024 10.2 (L) 11.7 - 15.7 g/dL Final   06/06/2024 6.8 (LL) 11.7 - 15.7 g/dL Final   11/23/2020 11.4 (L) 11.7 - 15.7 g/dL Final   11/29/2011 11.8 11.7 - 15.7 g/dL Final   10/28/2011 11.7 11.7 - 15.7 g/dL Final     Platelet Count   Date Value Ref Range Status   11/02/2024 203 150 - 450 10e3/uL Final   07/23/2024 234 150 - 450 10e3/uL Final   06/06/2024 362 150 - 450 10e3/uL Final   11/23/2020 226 150 - 450 10e9/L Final   04/18/2011 214 150 - 450 10e9/L Final   08/28/2006 184 150 - 450 10e9/L Final     Assessment/plan:  This is a pleasant 49-year-old female who presents today to discuss treatment option for symptomatic uterine fibroid via uterine artery embolization.  We discussed the results of her MRI pelvis without contrast that was performed on 1/31/2025.  The patient did refuse contrast with the MRI.  There is a large heterogeneous intramural fibroid in the anterior myometrium distorting the endometrial stripe and a small intramural fibroid in the posterior myometrium on the right side.  We discussed the procedure of a uterine artery fibroid embolization.  We discussed that this will require contrast.  We discussed that an angiogram is performed of both uterine arteries with embolization using small particles.  We discussed that this is an outpatient procedure, with a 23-hour observation to control pain and nausea.  We discussed with the patient that once embolization has been completed that abdominal cramping is expected and can be intense.  There will also be nausea associated with this as well.  We discussed that we we will manage the symptoms using anti-inflammatories, PCA Dilaudid and antinausea medications.  We discussed that usually by the next day the patient's pain is managed with oral medications.  We discussed postembolization syndrome  which could last for approximately 1 week.  We discussed the risk of the procedure which include infection, bleeding, damage to the artery, and although rare fibroid expulsion.  The patient appreciates our visit today and will contact us with her decision.  This was a in person visit in which 40 minutes of  total time was spent (either in face-to-face or non-face-to-face time).      Dr. Katey Marion DO, RPVI  Pager: [phone]  Interventional Radiology   [facility]  [phone]

## 2025-04-13 ENCOUNTER — HEALTH MAINTENANCE LETTER (OUTPATIENT)
Age: 50
End: 2025-04-13

## 2025-06-13 ENCOUNTER — RESULTS FOLLOW-UP (OUTPATIENT)
Dept: PEDIATRICS | Facility: CLINIC | Age: 50
End: 2025-06-13

## 2025-06-18 ENCOUNTER — PATIENT OUTREACH (OUTPATIENT)
Dept: PEDIATRICS | Facility: CLINIC | Age: 50
End: 2025-06-18
Payer: COMMERCIAL

## 2025-07-21 ENCOUNTER — TELEPHONE (OUTPATIENT)
Dept: OTHER | Facility: CLINIC | Age: 50
End: 2025-07-21
Payer: COMMERCIAL

## 2025-07-21 NOTE — TELEPHONE ENCOUNTER
Patient called, she states she feels like she maybe going through menopause and wants to wait to see how things go. She will call us if she would like to proceed with UFE.  Ijeoma Freedman RN  IR nurse clinician  [phone]

## 2025-08-08 DIAGNOSIS — B00.1 RECURRENT COLD SORES: ICD-10-CM

## 2025-08-11 RX ORDER — VALACYCLOVIR HYDROCHLORIDE 1 G/1
2000 TABLET, FILM COATED ORAL PRN
Qty: 16 TABLET | Refills: 11 | Status: SHIPPED | OUTPATIENT
Start: 2025-08-11

## 2025-08-23 DIAGNOSIS — E03.9 HYPOTHYROIDISM, UNSPECIFIED TYPE: ICD-10-CM

## 2025-08-23 RX ORDER — LEVOTHYROXINE SODIUM 150 UG/1
TABLET ORAL
Qty: 90 TABLET | Refills: 3 | Status: SHIPPED | OUTPATIENT
Start: 2025-08-23